# Patient Record
Sex: FEMALE | Race: WHITE | Employment: OTHER | URBAN - METROPOLITAN AREA
[De-identification: names, ages, dates, MRNs, and addresses within clinical notes are randomized per-mention and may not be internally consistent; named-entity substitution may affect disease eponyms.]

---

## 2019-02-13 ENCOUNTER — HOSPITAL ENCOUNTER (OUTPATIENT)
Dept: WOMENS IMAGING | Age: 64
Discharge: HOME | End: 2019-02-13
Payer: COMMERCIAL

## 2019-02-13 DIAGNOSIS — Z12.31 VISIT FOR SCREENING MAMMOGRAM: ICD-10-CM

## 2019-02-13 PROCEDURE — 77067 SCR MAMMO BI INCL CAD: CPT

## 2019-04-07 LAB
CHOLESTEROL, TOTAL: 130 MG/DL
CHOLESTEROL/HDL RATIO: NORMAL
HDLC SERPL-MCNC: 54 MG/DL (ref 35–70)
LDL CHOLESTEROL CALCULATED: 64 MG/DL (ref 0–160)
TRIGL SERPL-MCNC: 58 MG/DL
VLDLC SERPL CALC-MCNC: 12 MG/DL

## 2019-04-30 ENCOUNTER — TELEPHONE (OUTPATIENT)
Dept: FAMILY MEDICINE CLINIC | Age: 64
End: 2019-04-30

## 2019-04-30 NOTE — TELEPHONE ENCOUNTER
PT ADVISED OK PER DR HARDIN TO P/U A COPY OF BW 4/8/19 AND PAP RESULTS 4/1/19. COPIES MADE AND PUT UPFRONT.     Electronically signed by Yasmin Magaña MA on 4/30/2019 at 4:23 PM

## 2019-09-06 DIAGNOSIS — Z86.69 H/O BELL'S PALSY: ICD-10-CM

## 2019-09-06 RX ORDER — LANOLIN ALCOHOL/MO/W.PET/CERES
3 CREAM (GRAM) TOPICAL NIGHTLY PRN
COMMUNITY

## 2019-09-06 RX ORDER — VIT C/B6/B5/MAGNESIUM/HERB 173 50-5-6-5MG
2 CAPSULE ORAL DAILY
COMMUNITY

## 2019-09-06 RX ORDER — CALCIUM LACTATE 84 MG(648)
TABLET ORAL
COMMUNITY
End: 2019-12-31

## 2019-09-06 RX ORDER — CALCIUM/MAGNESIUM/ZINC 333-133 MG
1 TABLET ORAL DAILY
COMMUNITY

## 2019-12-31 ENCOUNTER — OFFICE VISIT (OUTPATIENT)
Dept: FAMILY MEDICINE CLINIC | Age: 64
End: 2019-12-31
Payer: COMMERCIAL

## 2019-12-31 VITALS
SYSTOLIC BLOOD PRESSURE: 130 MMHG | DIASTOLIC BLOOD PRESSURE: 84 MMHG | HEART RATE: 68 BPM | OXYGEN SATURATION: 97 % | HEIGHT: 66 IN | WEIGHT: 173.8 LBS | BODY MASS INDEX: 27.93 KG/M2

## 2019-12-31 DIAGNOSIS — Z00.00 WELLNESS EXAMINATION: ICD-10-CM

## 2019-12-31 PROCEDURE — 99396 PREV VISIT EST AGE 40-64: CPT | Performed by: FAMILY MEDICINE

## 2019-12-31 ASSESSMENT — PATIENT HEALTH QUESTIONNAIRE - PHQ9
2. FEELING DOWN, DEPRESSED OR HOPELESS: 0
SUM OF ALL RESPONSES TO PHQ QUESTIONS 1-9: 0
SUM OF ALL RESPONSES TO PHQ9 QUESTIONS 1 & 2: 0
1. LITTLE INTEREST OR PLEASURE IN DOING THINGS: 0
SUM OF ALL RESPONSES TO PHQ QUESTIONS 1-9: 0

## 2020-01-28 ENCOUNTER — TELEPHONE (OUTPATIENT)
Dept: FAMILY MEDICINE CLINIC | Age: 65
End: 2020-01-28

## 2020-01-30 PROBLEM — Z00.00 WELLNESS EXAMINATION: Status: RESOLVED | Noted: 2019-12-31 | Resolved: 2020-01-30

## 2020-02-04 ENCOUNTER — TELEPHONE (OUTPATIENT)
Dept: FAMILY MEDICINE CLINIC | Age: 65
End: 2020-02-04

## 2020-02-04 NOTE — TELEPHONE ENCOUNTER
TO DR. Sammy Dao-    PATIENT WANTS TO KNOW WHEN SHE IS SUPPOSE TO GET ANOTHER MAMMOGRAM---HER LAST ONE WAS 2/13/2019. ALSO, SHE ASKED A WHILE AGO IF YOU WOULD ACCEPT HER  AS A PATIENT AND SHE HAS HAD NO ANSWER YET. (I DID LET PATIENT KNOW THAT DR. Arroyo 96 UNTIL 2/24/20)      [facility] -[id number]

## 2020-03-26 ENCOUNTER — TELEPHONE (OUTPATIENT)
Dept: FAMILY MEDICINE CLINIC | Age: 65
End: 2020-03-26

## 2020-12-15 ENCOUNTER — OFFICE VISIT (OUTPATIENT)
Dept: PRIMARY CARE CLINIC | Age: 65
End: 2020-12-15

## 2020-12-15 ENCOUNTER — HOSPITAL ENCOUNTER (OUTPATIENT)
Age: 65
Setting detail: SPECIMEN
Discharge: HOME | End: 2020-12-15
Payer: OTHER GOVERNMENT

## 2020-12-15 VITALS — OXYGEN SATURATION: 98 % | HEART RATE: 90 BPM | TEMPERATURE: 96.2 F

## 2020-12-15 PROCEDURE — 99213 OFFICE O/P EST LOW 20 MIN: CPT | Performed by: NURSE PRACTITIONER

## 2020-12-15 PROCEDURE — U0002 COVID-19 LAB TEST NON-CDC: HCPCS

## 2020-12-15 NOTE — PATIENT INSTRUCTIONS
Your COVID 19 test can take 3-5 days for the results come back. We ask that you make a Mychart page and view your test results this way. You will need to Self quarantine until you know your results. Increase fluids rest  Saline nasal spray as directed  Warm salt gargles for throat discomfort  Monitor temperature twice a day  Tylenol for fevers and/or discomfort. If symptoms are worse -Go to the ER. Follow up with your primary doctor    To Whom it May Concern:    Julio Cesar Cordova has been tested for COVID on 12/15/20. They may NOT return to work until their lab test results back and they been fever free for 3 days. If test is positive they must stay home for 2 weeks or until they test negative or as directed by the [facility] Department.

## 2020-12-15 NOTE — PROGRESS NOTES
12/15/2020    HPI:  Chief complaint and history of present illness as per medical assistant/nurse documented today in the Flu/COVID-19 clinic. MEDICATIONS:  Prior to Visit Medications    Medication Sig Taking? Authorizing Provider   UNABLE TO FIND Take 1 tablet by mouth 2 times daily Indications: [address]  Historical Provider, MD   COD LIVER OIL PO Take by mouth OTC  Historical Provider, MD   Echinacea 400 MG CAPS Take 1 capsule by mouth daily  Historical Provider, MD   Garlic 4835 MG CAPS Take 1 tablet by mouth daily  Historical Provider, MD   melatonin 3 MG TABS tablet Take 3 mg by mouth nightly as needed  Historical Provider, MD   Turmeric 500 MG CAPS Take 2 tablets by mouth daily  Historical Provider, MD   diphenhydrAMINE (BENADRYL) 25 MG capsule Take 25 mg by mouth daily as needed for Itching. Historical Provider, MD   ibuprofen (ADVIL;MOTRIN) 200 MG tablet Take 200 mg by mouth daily as needed for Pain.   Historical Provider, MD       Allergies   Allergen Reactions    Azithromycin Hives    Levaquin [Levofloxacin In D5w]     Quinolones Hives    Penicillins Hives   ,   Past Medical History:   Diagnosis Date    Chest pain, unspecified     H/O Bell's palsy     H/O exercise stress test 10-3-14    Negative    Menopause     Rosacea     Sleep apnea     uses C-Pap   ,   Past Surgical History:   Procedure Laterality Date    CYST REMOVAL      TUBAL LIGATION     ,   Social History     Tobacco Use    Smoking status: Former Smoker     Packs/day: 0.25     Years: 3.00     Pack years: 0.75     Types: Cigarettes     Start date: 1971     Quit date: 1974     Years since quittin.9    Smokeless tobacco: Never Used   Substance Use Topics    Alcohol use: Yes     Comment: one or less a day    Drug use: Not on file   ,   Family History   Problem Relation Age of Onset    Coronary Art Dis Mother     Emphysema Mother     Hypertension Father         Brain Aneurysm    Hypertension Sister         sisters x 2    Stroke Maternal Grandmother     Colon Cancer Maternal Grandfather     Stroke Paternal Grandfather    ,   There is no immunization history on file for this patient.,   Health Maintenance   Topic Date Due    Hepatitis C screen  1955    HIV screen  02/07/1972    Diabetes screen  02/07/1997    Colon cancer screen colonoscopy  02/07/2007    Flu vaccine (1) 09/01/2020    DTaP/Tdap/Td vaccine (1 - Tdap) 12/31/2020 (Originally 2/7/1976)    Shingles Vaccine (1 of 2) 12/31/2020 (Originally 2/7/2007)    Breast cancer screen  02/13/2021    Cervical cancer screen  04/01/2022    Lipid screen  04/06/2024    Hepatitis A vaccine  Aged Out    Hepatitis B vaccine  Aged Out    Hib vaccine  Aged Out    Meningococcal (ACWY) vaccine  Aged Out    Pneumococcal 0-64 years Vaccine  Aged Out       PHYSICAL EXAM:  Physical Exam  Constitutional:       Appearance: Normal appearance. HENT:      Head: Normocephalic. Right Ear: Tympanic membrane, ear canal and external ear normal.      Left Ear: Tympanic membrane, ear canal and external ear normal.      Nose: Congestion present. Mouth/Throat:      Lips: Pink. Mouth: Mucous membranes are moist.      Pharynx: Oropharynx is clear. Neck:      Musculoskeletal: Neck supple. Cardiovascular:      Rate and Rhythm: Normal rate and regular rhythm. Heart sounds: Normal heart sounds. Pulmonary:      Effort: Pulmonary effort is normal.      Breath sounds: Normal breath sounds. Skin:     General: Skin is warm and dry. Neurological:      Mental Status: She is alert and oriented to person, place, and time. Psychiatric:         Mood and Affect: Mood normal.         Behavior: Behavior normal.         ASSESSMENT/PLAN:  1. Nasal congestion  Your COVID 19 test can take 3-5 days for the results come back. We ask that you make a Mychart page and view your test results this way. You will need to Self quarantine until you know your results.      Increase fluids rest  Saline nasal spray as directed  Warm salt gargles for throat discomfort  Monitor temperature twice a day  Tylenol for fevers and/or discomfort. If symptoms are worse -Go to the ER. Follow up with your primary doctor    To Whom it May Concern:    Sheela Pappas has been tested for COVID on 12/15/20. They may NOT return to work until their lab test results back and they been fever free for 3 days. If test is positive they must stay home for 2 weeks or until they test negative or as directed by the [facility]. - COVID-19 Ambulatory    2. Loss of taste    - COVID-19 Ambulatory    3. Loss of smell    - COVID-19 Ambulatory      FOLLOW-UP:  Return if symptoms worsen or fail to improve.     In addition to other information, the printed after visit summary provided to the patient includes:  [x] COVID-19 Self care instructions  [x] COVID-19 General patient information

## 2020-12-15 NOTE — PROGRESS NOTES
12/15/20  Muna Gordillo  1955    FLU/COVID-19 CLINIC EVALUATION    HPI SYMPTOMS:    Employer: Retired  [x] Fevers  [] Chills  [] Cough  [] Coughing up blood  [] Chest Congestion  [x] Nasal Congestion  [] Feeling short of breath  [] Sometimes  [] Frequently  [] All the time  [] Chest pain  [] Headaches  []Tolerable  [] Severe  [] Sore throat  [] Muscle aches  [] Nausea  [] Vomiting  []Unable to keep fluids down  [] Diarrhea  []Severe    [x] OTHER SYMPTOMS:  Loss of taste and smell    Symptom Duration:   [] 1  [] 2   [] 3   [] 4    [x] 5   [] 6   [] 7   [] 8   [] 9   [] 10   [] 11   [] 12   [] 13   [] 14   [] Longer than 14 days    Symptom course:   [] Worsening     [x] Stable     [] Improving    RISK FACTORS:    [] Pregnant or possibly pregnant  [x] Age over 61  [] Diabetes  [] Heart disease  [] Asthma  [] COPD/Other chronic lung diseases  [] Active Cancer  [] On Chemotherapy  [] Taking oral steroids  [] History Lymphoma/Leukemia  [] Close contact with a lab confirmed COVID-19 patient within 14 days of symptom onset  [] History of travel from affected geographical areas within 14 days of symptom onset       VITALS:  There were no vitals filed for this visit. TESTS:    POCT FLU:  [] Positive     []Negative    ASSESSMENT:    [] Flu  [] Possible COVID-19  [] Strep    PLAN:    [] Discharge home with written instructions for:  [] Flu management  [] Possible COVID-19 infection with self-quarantine and management of symptoms  [] Follow-up with primary care physician or emergency department if worsens  [] Evaluation per physician/NP/PA in clinic  [] Sent to ER       An  electronic signature was used to authenticate this note.      --Av Reddy LPN on 83/24/7216 at 8:32 AM

## 2020-12-16 LAB
SARS-COV-2: DETECTED
SOURCE: ABNORMAL

## 2021-12-16 LAB
ALBUMIN SERPL-MCNC: 3.9 G/DL
ALBUMIN SERPL-MCNC: 3.9 G/DL
ALP BLD-CCNC: 55 U/L
ALP BLD-CCNC: 55 U/L
ALT SERPL-CCNC: 66 U/L
ALT SERPL-CCNC: 66 U/L
ANION GAP SERPL CALCULATED.3IONS-SCNC: 10 MMOL/L
ANION GAP SERPL CALCULATED.3IONS-SCNC: 10 MMOL/L
AST SERPL-CCNC: 43 U/L
AST SERPL-CCNC: 43 U/L
BASOPHILS ABSOLUTE: NORMAL
BASOPHILS RELATIVE PERCENT: NORMAL
BILIRUB SERPL-MCNC: 0.5 MG/DL (ref 0.1–1.4)
BILIRUB SERPL-MCNC: 4.2 MG/DL (ref 0.1–1.4)
BUN BLDV-MCNC: 10 MG/DL
BUN BLDV-MCNC: 10 MG/DL
CALCIUM SERPL-MCNC: 8.9 MG/DL
CALCIUM SERPL-MCNC: 8.9 MG/DL
CHLORIDE BLD-SCNC: 105 MMOL/L
CHLORIDE BLD-SCNC: 105 MMOL/L
CHOLESTEROL, TOTAL: 106 MG/DL
CHOLESTEROL/HDL RATIO: NORMAL
CO2: 105 MMOL/L
CO2: 105 MMOL/L
CREAT SERPL-MCNC: 0.9 MG/DL
CREAT SERPL-MCNC: 0.9 MG/DL
EOSINOPHILS ABSOLUTE: NORMAL
EOSINOPHILS RELATIVE PERCENT: NORMAL
GFR CALCULATED: 67
GFR CALCULATED: 67
GLUCOSE BLD-MCNC: 102 MG/DL
GLUCOSE BLD-MCNC: 102 MG/DL
HCT VFR BLD CALC: NORMAL %
HDLC SERPL-MCNC: 37 MG/DL (ref 35–70)
HEMOGLOBIN: NORMAL
LDL CHOLESTEROL CALCULATED: 50 MG/DL (ref 0–160)
LYMPHOCYTES ABSOLUTE: NORMAL
LYMPHOCYTES RELATIVE PERCENT: NORMAL
MCH RBC QN AUTO: NORMAL PG
MCHC RBC AUTO-ENTMCNC: NORMAL G/DL
MCV RBC AUTO: NORMAL FL
MONOCYTES ABSOLUTE: NORMAL
MONOCYTES RELATIVE PERCENT: NORMAL
NEUTROPHILS ABSOLUTE: NORMAL
NEUTROPHILS RELATIVE PERCENT: NORMAL
NONHDLC SERPL-MCNC: NORMAL MG/DL
PDW BLD-RTO: NORMAL %
PLATELET # BLD: NORMAL 10*3/UL
PMV BLD AUTO: NORMAL FL
POTASSIUM SERPL-SCNC: 4.5 MMOL/L
POTASSIUM SERPL-SCNC: 4.5 MMOL/L
RBC # BLD: NORMAL 10*6/UL
SODIUM BLD-SCNC: 137 MMOL/L
SODIUM BLD-SCNC: 137 MMOL/L
TOTAL PROTEIN: 8.1
TOTAL PROTEIN: 8.1
TRIGL SERPL-MCNC: 95 MG/DL
TSH SERPL DL<=0.05 MIU/L-ACNC: 1.45 UIU/ML
VLDLC SERPL CALC-MCNC: 19 MG/DL
WBC # BLD: NORMAL 10*3/UL

## 2021-12-20 ENCOUNTER — TELEPHONE (OUTPATIENT)
Dept: INTERNAL MEDICINE CLINIC | Age: 66
End: 2021-12-20

## 2021-12-20 NOTE — TELEPHONE ENCOUNTER
----- Message from Rasta Viji sent at 2021  2:46 PM EST -----  Subject: Appointment Request    Reason for Call: New Patient Request Appointment    QUESTIONS  Type of Appointment? New Patient/New to Provider  Reason for appointment request? No appointments available during search  Additional Information for Provider? Friends of Faizan [employer] (aidan and Abby)   referred her to [employer] and patient is looking to schedule a new   patient exam with her. No appointments were available looking to schedule   in Feb. if she is not excepting at this time they would like to be put on   a waiting list. also looking to get her  in with Tacy Cure also   Avis Mckinney)  ---------------------------------------------------------------------------  --------------  [employer]  What is the best way for the office to contact you? OK to leave message on   voicemail  Preferred Call Back Phone Number? [phone]  ---------------------------------------------------------------------------  --------------  SCRIPT ANSWERS  Relationship to Patient? Self  Specialty Confirmation? Primary Care  Is this the first appointment to establish care for a ? No  Have you been diagnosed with, awaiting test results for, or told that you   are suspected of having COVID-19 (Coronavirus)? (If patient has tested   negative or was tested as a requirement for work, school, or travel and   not based on symptoms, answer no)? No  Within the past two weeks have you developed any of the following symptoms   (answer no if symptoms have been present longer than 2 weeks or began   more than 2 weeks ago)? Fever or Chills, Cough, Shortness of breath or   difficulty breathing, Loss of taste or smell, Sore throat, Nasal   congestion, Sneezing or runny nose, Fatigue or generalized body aches   (answer no if pain is specific to a body part e.g. back pain), Diarrhea,   Headache?  No  Have you had close contact with someone with COVID-19 in the last 14 days? No  (Service Expert  click yes below to proceed with [employer] As Usual   Scheduling)?  Yes

## 2022-02-07 ENCOUNTER — OFFICE VISIT (OUTPATIENT)
Dept: INTERNAL MEDICINE CLINIC | Age: 67
End: 2022-02-07
Payer: MEDICARE

## 2022-02-07 VITALS
RESPIRATION RATE: 18 BRPM | SYSTOLIC BLOOD PRESSURE: 132 MMHG | HEIGHT: 66 IN | OXYGEN SATURATION: 97 % | BODY MASS INDEX: 29.09 KG/M2 | DIASTOLIC BLOOD PRESSURE: 84 MMHG | WEIGHT: 181 LBS | HEART RATE: 76 BPM

## 2022-02-07 DIAGNOSIS — Z12.4 CERVICAL CANCER SCREENING: ICD-10-CM

## 2022-02-07 DIAGNOSIS — R74.01 TRANSAMINITIS: ICD-10-CM

## 2022-02-07 DIAGNOSIS — Z12.31 ENCOUNTER FOR SCREENING MAMMOGRAM FOR BREAST CANCER: ICD-10-CM

## 2022-02-07 DIAGNOSIS — L98.9 SKIN LESION: ICD-10-CM

## 2022-02-07 DIAGNOSIS — R00.2 PALPITATIONS: Primary | ICD-10-CM

## 2022-02-07 DIAGNOSIS — G47.33 OSA ON CPAP: ICD-10-CM

## 2022-02-07 DIAGNOSIS — R73.01 IMPAIRED FASTING GLUCOSE: ICD-10-CM

## 2022-02-07 DIAGNOSIS — Z99.89 OSA ON CPAP: ICD-10-CM

## 2022-02-07 DIAGNOSIS — Z12.11 COLON CANCER SCREENING: ICD-10-CM

## 2022-02-07 PROCEDURE — 99204 OFFICE O/P NEW MOD 45 MIN: CPT | Performed by: INTERNAL MEDICINE

## 2022-02-07 RX ORDER — MAGNESIUM OXIDE 400 MG/1
400 TABLET ORAL DAILY
COMMUNITY

## 2022-02-07 RX ORDER — VITAMIN B COMPLEX
1 CAPSULE ORAL DAILY
COMMUNITY

## 2022-02-07 RX ORDER — MULTIVIT WITH MINERALS/LUTEIN
1000 TABLET ORAL DAILY
COMMUNITY

## 2022-02-07 RX ORDER — ASPIRIN 81 MG/1
81 TABLET, CHEWABLE ORAL DAILY
COMMUNITY

## 2022-02-07 SDOH — ECONOMIC STABILITY: FOOD INSECURITY: WITHIN THE PAST 12 MONTHS, YOU WORRIED THAT YOUR FOOD WOULD RUN OUT BEFORE YOU GOT MONEY TO BUY MORE.: NEVER TRUE

## 2022-02-07 SDOH — ECONOMIC STABILITY: FOOD INSECURITY: WITHIN THE PAST 12 MONTHS, THE FOOD YOU BOUGHT JUST DIDN'T LAST AND YOU DIDN'T HAVE MONEY TO GET MORE.: NEVER TRUE

## 2022-02-07 ASSESSMENT — PATIENT HEALTH QUESTIONNAIRE - PHQ9
SUM OF ALL RESPONSES TO PHQ QUESTIONS 1-9: 1
SUM OF ALL RESPONSES TO PHQ9 QUESTIONS 1 & 2: 1
SUM OF ALL RESPONSES TO PHQ QUESTIONS 1-9: 1
SUM OF ALL RESPONSES TO PHQ QUESTIONS 1-9: 1
1. LITTLE INTEREST OR PLEASURE IN DOING THINGS: 0
2. FEELING DOWN, DEPRESSED OR HOPELESS: 1
SUM OF ALL RESPONSES TO PHQ QUESTIONS 1-9: 1

## 2022-02-07 ASSESSMENT — SOCIAL DETERMINANTS OF HEALTH (SDOH): HOW HARD IS IT FOR YOU TO PAY FOR THE VERY BASICS LIKE FOOD, HOUSING, MEDICAL CARE, AND HEATING?: NOT HARD AT ALL

## 2022-02-07 NOTE — PROGRESS NOTES
2/8/22    Saira Mohan  1955    Chief Complaint   Patient presents with   Riky Zavala [facility] New Doctor       History of Present Illness:  Saira Mohan is a 74 y.o. pleasant lady presenting today to establish care as a new patient with a chief complaint of skin lesions, transaminitis. She has a past medical history significant for:  HL (LDL 50, TG 95 on 12/16/2021), not on statin   ANA, on CPAP  Bell's palsy L sided (2018)   COVID-19 (12/2020, ?12/2021)  S/p tubal ligation   Former smoker (quit 1974)   On multiple supplements through [employer] Dec 2021 with ALT 66, AST 43. . # Dr. Nix Sat GI: colonoscopy 2014. Was recommended 10-year surveillance. Reports she had 1 polyp in the past and repeat after 5 years was in 2014 and it was clear. # Reports pap smear in 2019. Unsure if she had HPV done. She was seeing Dr. Casper Dhaliwal. 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   # Last mammogram was done 2019 and it was unremarkable. # Father with brain aneurysm/?AVM. Patient was having headaches in Dec 2021 but thinks she might have had COVID-19. Now her headaches have subsided. BP stable. # Patient has palpitations which cause her to feel light-headed. This occurs once every 3-4 months. This lasts for a few seconds. No known trigger. # Has skin lesion on her back that has changed over the years. Also has multiple skin tags. Also has a lesion on her nose L bridge. She has had multiple lesions on her face in the past and she has tried holistic measures in the past to improve those (both topical and PO). Also with skin lesion on her L nipple for years. She has manipulated it and sometimes discharge is expressed (similar to a kraus or blackhead per patient).      Retired from [employer] maintenance:   Health Maintenance Due   Topic Date Due    Hepatitis C screen  Never done    COVID-19 Vaccine (1) Never done    HIV screen  Never done    DTaP/Tdap/Td vaccine (1 - Tdap) Never done    Diabetes screen  Never done    Colon cancer screen colonoscopy  Never done    Shingles Vaccine (1 of 2) Never done    DEXA (modify frequency per FRAX score)  Never done    Breast cancer screen  02/13/2021    Flu vaccine (1) Never done   [employer] Visit (AWV)  Never done    Pneumococcal 65+ years Vaccine (1 of 1 - PPSV23) Never done         Review of Systems:  Constitutional: no fevers, no chills, no night sweats, no weight loss, no weight gain, no fatigue   Pain assessment: no pain  Head: no headaches  Ears: no hearing loss, no tinnitus, no vertigo  Eyes: no blurry vision, no diplopia, no dryness, no itchiness  Mouth: no oral ulcers, no dry mouth, no sore throat  Nose: no nasal congestion, no epistaxis  Cardiac: no chest pain, infrequent palpitations, no leg swelling, no orthopnea, no PND, no syncope  Pulmonary: no dyspnea, no cough, no wheezing, no hemoptysis  GI: no nausea, no vomiting, no diarrhea, no constipation, no abdominal pain, no hematochezia  : no dysuria, no frequency, no urgency, no hematuria, no frothy urine, no dyspareunia, no pelvic pain, no vaginal bleeding, no abnormal vaginal discharge  MSK: no arthralgias, no myalgias, no early morning stiffness, no Raynaud's   Neuro: no focal neurological deficits, no seizures  Sleep: no snoring, no daytime somnolence   Psych: no depression, no anxiety, no suicidal ideation      Physical Exam:  VITALS:   /84 (Site: Left Upper Arm, Position: Sitting, Cuff Size: Large Adult)   Pulse 76   Resp 18   Ht 5' 6\" (1.676 m)   Wt 181 lb (82.1 kg)   SpO2 97%   BMI 29.21 kg/m²     PHYSICAL EXAMINATION:  General: alert, awake, and oriented to time, place, person, and situation. Not in acute distress   Skin: multiple skin tags on neck, multiple hyperpigmented macules on back, erythematous patch on L nasal bridge   Head: normocephalic/atraumatic  Eyes: anicteric sclera, well-injected conjunctiva.  Pupils are equally round and reactive to light. Extraocular movements are intact   Nose: no septal deviation evident  Sinuses: no sinus tenderness  Ears: external ears normal  Neck: supple, no cervical lymphadenopathy, thyroid symmetric and not enlarged, no bruits   Heart: regular rate and rhythm, regular S1/S2, no S3/S4, no audible murmurs, no audible friction rub  Lungs: clear to auscultation bilaterally, no audible crackles, no audible wheezes, no audible rhonchi    Abdomen: normal bowel sounds, soft abdomen, non-tender, no palpable masses  Extremities: no edema, warm, no cyanosis, no clubbing. Good capillary refill   MSK: no tenderness across spinous processes, full ROM in all 4 extremities. No joint swelling or tenderness   Peripheral vascular: 2+ pulses symmetric (radial)  Neuro: gait normal, CN II-XII intact, motor power 5/5 in all 4 extremities, sensation intact and symmetric    Labs   I have personally reviewed labs, and discussed pertinent findings with patient on this date 2/8/2022     Imaging   I have personally reviewed imaging, and discussed pertinent findings with patient on this date 2/8/2022     Other notes  I have personally reviewed other notes, and discussed pertinent findings with patient on this date 2/8/2022       Assessment/Plan:     1. Palpitations  Needs updated labs. Cut back caffeine  May consider Cardiology referral? They occur every 3-4 months so Holter monitor may not be feasible. Start diary. 2. Skin lesion  - External Referral To Dermatology    3. Transaminitis  - HEPATIC FUNCTION PANEL; Future    4. Impaired fasting glucose  - HEMOGLOBIN A1C; Future    5. ANA on CPAP  Continue CPAP    6. Cervical cancer screening  Pap smear with me in the next 2 weeks    7. Encounter for screening mammogram for breast cancer  - [facility] ANGÉLICA DIGITAL SCREEN BILATERAL    8.  Colon cancer screening  Will try to get records from her 2014 colonoscopy (was told 10 year surveillance)      Care discussed with patient and questions answered. Patient verbalizes understanding and agrees with plan. Discussed with patient the importance of continuity of care. I encouraged patient to schedule next appointment within 2 weeks (pap smear) with me. Patient prefers to be reached by Phone call at [phone] for future medical correspondence. Encouraged to activate MyChart. I reviewed and reconciled the medications this visit. I reviewed and updated the past medical, surgical, social, and family history during this visit. After visit summary provided.        Supa Franks MD  Internal Medicine  2/8/2022   8:42 AM

## 2022-02-17 ENCOUNTER — OFFICE VISIT (OUTPATIENT)
Dept: INTERNAL MEDICINE CLINIC | Age: 67
End: 2022-02-17
Payer: MEDICARE

## 2022-02-17 VITALS
WEIGHT: 183 LBS | HEART RATE: 86 BPM | RESPIRATION RATE: 20 BRPM | SYSTOLIC BLOOD PRESSURE: 118 MMHG | DIASTOLIC BLOOD PRESSURE: 82 MMHG | BODY MASS INDEX: 29.41 KG/M2 | HEIGHT: 66 IN | OXYGEN SATURATION: 97 %

## 2022-02-17 DIAGNOSIS — N89.8 VAGINAL DRYNESS: ICD-10-CM

## 2022-02-17 DIAGNOSIS — Z12.4 CERVICAL CANCER SCREENING: Primary | ICD-10-CM

## 2022-02-17 DIAGNOSIS — Z12.31 ENCOUNTER FOR SCREENING MAMMOGRAM FOR BREAST CANCER: ICD-10-CM

## 2022-02-17 PROCEDURE — 99397 PER PM REEVAL EST PAT 65+ YR: CPT | Performed by: INTERNAL MEDICINE

## 2022-02-17 NOTE — PROGRESS NOTES
2/17/22    Louis Kidd  1955    Chief Complaint   Patient presents with    Gynecologic Exam       History of Present Illness:  Louis Kidd is a 74 y.o. pleasant lady presenting today for pap smear and pelvic examination. She has a past medical history significant for:  HL (LDL 50, TG 95 on 12/16/2021), not on statin   ANA, on CPAP  Bell's palsy L sided (2018)   COVID-19 (12/2020, ?12/2021)  S/p tubal ligation   Former smoker (quit 1974)   On multiple supplements through [employer]     # Reports pap smear in 2019. Unsure if she had HPV done. She was seeing Dr. Yamilex Jeong. 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   # Last mammogram was done 2019 and it was unremarkable. # Having vaginal dryness. # Has some stress urinary incontinence, at times has urge incontinence. She is interested in pelvic floor exercises which she will download.        Health maintenance:   Health Maintenance Due   Topic Date Due    Hepatitis C screen  Never done    COVID-19 Vaccine (1) Never done    HIV screen  Never done    DTaP/Tdap/Td vaccine (1 - Tdap) Never done    Diabetes screen  Never done    Colorectal Cancer Screen  Never done    Shingles Vaccine (1 of 2) Never done    DEXA (modify frequency per FRAX score)  Never done    Breast cancer screen  02/13/2021    Flu vaccine (1) Never done   [employer] Visit (AWV)  Never done    Pneumococcal 65+ years Vaccine (1 of 1 - PPSV23) Never done         Review of Systems:  Constitutional: no fevers, no chills, no night sweats, no weight loss, no weight gain, no fatigue   Pain assessment: no pain  Head: no headaches  Ears: no hearing loss, no tinnitus, no vertigo  Eyes: no blurry vision, no diplopia, no dryness, no itchiness  Mouth: no oral ulcers, no dry mouth, no sore throat  Nose: no nasal congestion, no epistaxis  Cardiac: no chest pain, infrequent palpitations, no leg swelling, no orthopnea, no PND, no syncope  Pulmonary: no dyspnea, no cough, no wheezing, no hemoptysis  GI: no nausea, no vomiting, no diarrhea, no constipation, no abdominal pain, no hematochezia  : No dysuria, no frequency, no urgency, no hematuria, no frothy urine, no dyspareunia, no pelvic pain, no vaginal bleeding, no abnormal vaginal discharge  MSK: no arthralgias, no myalgias, no early morning stiffness, no Raynaud's   Neuro: no focal neurological deficits, no seizures  Sleep: no snoring, no daytime somnolence   Psych: no depression, no anxiety, no suicidal ideation      Physical Exam:  VITALS:   /82 (Site: Right Upper Arm, Position: Sitting, Cuff Size: Large Adult)   Pulse 86   Resp 20   Ht 5' 6\" (1.676 m)   Wt 183 lb (83 kg)   SpO2 97%   BMI 29.54 kg/m²     PHYSICAL EXAMINATION:  General: alert, awake, and oriented to time, place, person, and situation. Not in acute distress   Skin: multiple skin tags on neck, multiple hyperpigmented macules on back, erythematous patch on L nasal bridge   Head: normocephalic/atraumatic  Eyes: anicteric sclera, well-injected conjunctiva. Pupils are equally round and reactive to light. Extraocular movements are intact   Neck: supple, no cervical lymphadenopathy, thyroid symmetric and not enlarged, no bruits   Heart: regular rate and rhythm, regular S1/S2, no S3/S4, no audible murmurs, no audible friction rub  Lungs: clear to auscultation bilaterally, no audible crackles, no audible wheezes, no audible rhonchi    Abdomen: normal bowel sounds, soft abdomen, non-tender, no palpable masses  Extremities: no edema, warm, no cyanosis, no clubbing. Good capillary refill   MSK: no tenderness across spinous processes, full ROM in all 4 extremities. No joint swelling or tenderness   Peripheral vascular: 2+ pulses symmetric (radial)  Pelvic exam (with female chaperone present in the room during examination): normal external genitalia, vulva, vagina, cervix, uterus, and adnexa. No rash or lesions noted on the right labia.  No rash or lesions noted on the left labia. No cervical motion tenderness, discharge, or friability. Dryness noted. No mass, tenderness, or fullness appreciated in right adnexum. No masses, tenderness, or fullness appreciated in left adnexum. No right inguinal adenopathy present. No left inguinal adenopathy present. Specimens obtained for pap smear. Patient tolerated exam well. Labs   I have personally reviewed labs, and discussed pertinent findings with patient     Imaging   I have personally reviewed imaging, and discussed pertinent findings with patient    Other notes  I have personally reviewed other notes, and discussed pertinent findings with patient      Assessment/Plan:     1. Cervical cancer screening  - PAP SMEAR    2. Vaginal dryness  Recommend KY gel during intercourse. May consider topical estrogen. Recommend against PO estrogen due to side effect profile (discussed with patient). She is holistic and wants to try coconut oil first (as recommended by her Chiropractor)     3. Encounter for screening mammogram for breast cancer  - [facility] ANGÉLICA DIGITAL SCREEN BILATERAL      Care discussed with patient and questions answered. Patient verbalizes understanding and agrees with plan. Discussed with patient the importance of continuity of care. I encouraged patient to schedule next appointment within 4 months with me. Patient prefers to be reached by Phone call at [phone] for future medical correspondence. Encouraged to activate [facility]. I reviewed and reconciled the medications this visit. I reviewed and updated the past medical, surgical, social, and family history during this visit. After visit summary provided.        Tommy Garcia MD  Internal Medicine  2/17/2022   12:08 PM

## 2022-02-17 NOTE — PROGRESS NOTES
Bladder leakage   Chiropractor does a pelvic lift. Collette Deist about a surgery and would like more information. Vaginal dryness. Painful sex. (maybe gyn appt.)   Vaginal odor for a long time.

## 2022-02-18 LAB
HPV COMMENT: NORMAL
HPV TYPE 16: NOT DETECTED
HPV TYPE 18: NOT DETECTED
HPVOH (OTHER TYPES): NOT DETECTED

## 2022-04-12 ENCOUNTER — HOSPITAL ENCOUNTER (OUTPATIENT)
Dept: WOMENS IMAGING | Age: 67
Discharge: HOME | End: 2022-04-12
Payer: MEDICARE

## 2022-04-12 DIAGNOSIS — Z12.31 ENCOUNTER FOR SCREENING MAMMOGRAM FOR MALIGNANT NEOPLASM OF BREAST: ICD-10-CM

## 2022-04-12 PROCEDURE — 77067 SCR MAMMO BI INCL CAD: CPT

## 2022-04-14 NOTE — RESULT ENCOUNTER NOTE
Nithya:    Betty Mrs. Maribel Berrios; Your PCP is currently out; we are helping to cover her inbox; your mammogram findings are normal and there is no concern for breast cancer at this time. Radiology recommends repeat in 1 year. Please let me know if you have any other questions or concerns.   Thanks,  Romana Watts PA-C

## 2022-05-02 ENCOUNTER — TELEPHONE (OUTPATIENT)
Dept: INTERNAL MEDICINE CLINIC | Age: 67
End: 2022-05-02

## 2022-05-02 DIAGNOSIS — R74.01 TRANSAMINITIS: Primary | ICD-10-CM

## 2022-05-02 DIAGNOSIS — E78.5 DYSLIPIDEMIA: ICD-10-CM

## 2022-05-02 NOTE — TELEPHONE ENCOUNTER
She wants the lipid panel and cmp that she had done in December repeated and would like the orders sent to [facility]. She paid out of pocket for the ones in December.

## 2022-05-02 NOTE — TELEPHONE ENCOUNTER
Pt. Called requesting to have the same lab orders added that she had done at [address] to the labs she had drawn today at [address] [place] [address] in [facility]. Today she had hepatic function and hemoglobin A1c.

## 2022-05-03 LAB
ALBUMIN SERPL-MCNC: 4.7 G/DL (ref 3.8–4.8)
ALP BLD-CCNC: 53 IU/L (ref 44–121)
ALT SERPL-CCNC: 37 IU/L (ref 0–32)
AST SERPL-CCNC: 30 IU/L (ref 0–40)
BILIRUB SERPL-MCNC: 0.6 MG/DL (ref 0–1.2)
BILIRUBIN DIRECT: 0.23 MG/DL (ref 0–0.4)
HBA1C MFR BLD: 5.5 % (ref 4.8–5.6)
TOTAL PROTEIN: 7.1 G/DL (ref 6–8.5)

## 2022-05-04 LAB
ALBUMIN SERPL-MCNC: 4.8 G/DL
ALP BLD-CCNC: 51 U/L
ALT SERPL-CCNC: 34 U/L
ANION GAP SERPL CALCULATED.3IONS-SCNC: 2.1 MMOL/L
AST SERPL-CCNC: 29 U/L
BILIRUB SERPL-MCNC: 0.7 MG/DL (ref 0.1–1.4)
BUN BLDV-MCNC: 11 MG/DL
CALCIUM SERPL-MCNC: 9.7 MG/DL
CHLORIDE BLD-SCNC: 107 MMOL/L
CHOLESTEROL, TOTAL: 95 MG/DL
CHOLESTEROL/HDL RATIO: 0
CO2: 21 MMOL/L
CREAT SERPL-MCNC: 0.81 MG/DL
GFR CALCULATED: 81
GLUCOSE BLD-MCNC: 96 MG/DL
HDLC SERPL-MCNC: 53 MG/DL (ref 35–70)
LDL CHOLESTEROL CALCULATED: 30 MG/DL (ref 0–160)
NONHDLC SERPL-MCNC: 30 MG/DL
POTASSIUM SERPL-SCNC: 4.5 MMOL/L
SODIUM BLD-SCNC: 144 MMOL/L
TOTAL PROTEIN: 7.1
TRIGL SERPL-MCNC: 47 MG/DL
VLDLC SERPL CALC-MCNC: 12 MG/DL

## 2022-05-05 LAB
A/G RATIO: 2.1 (ref 1.2–2.2)
ALBUMIN SERPL-MCNC: 4.8 G/DL (ref 3.8–4.8)
ALP BLD-CCNC: 51 IU/L (ref 44–121)
ALT SERPL-CCNC: 34 IU/L (ref 0–32)
AMBIGUOUS ABBREVIATION: NORMAL
AMBIGUOUS ABBREVIATION: NORMAL
AST SERPL-CCNC: 29 IU/L (ref 0–40)
BILIRUB SERPL-MCNC: 0.7 MG/DL (ref 0–1.2)
BUN / CREAT RATIO: 14 (ref 12–28)
BUN BLDV-MCNC: 11 MG/DL (ref 8–27)
CALCIUM SERPL-MCNC: 9.7 MG/DL (ref 8.7–10.3)
CHLORIDE BLD-SCNC: 107 MMOL/L (ref 96–106)
CHOLESTEROL, TOTAL: 95 MG/DL (ref 100–199)
CO2: 21 MMOL/L (ref 20–29)
COMMENT: ABNORMAL
CREAT SERPL-MCNC: 0.81 MG/DL (ref 0.57–1)
ESTIMATED GLOMERULAR FILTRATION RATE CREATININE EQUATION: 81 ML/MIN/1.73
GLOBULIN: 2.3 G/DL (ref 1.5–4.5)
GLUCOSE BLD-MCNC: 96 MG/DL (ref 65–99)
HDLC SERPL-MCNC: 53 MG/DL
LDL CHOLESTEROL CALCULATED: 30 MG/DL (ref 0–99)
POTASSIUM SERPL-SCNC: 4.5 MMOL/L (ref 3.5–5.2)
SODIUM BLD-SCNC: 144 MMOL/L (ref 134–144)
TOTAL PROTEIN: 7.1 G/DL (ref 6–8.5)
TRIGL SERPL-MCNC: 47 MG/DL (ref 0–149)
VLDLC SERPL CALC-MCNC: 12 MG/DL (ref 5–40)

## 2022-07-21 ENCOUNTER — OFFICE VISIT (OUTPATIENT)
Dept: INTERNAL MEDICINE CLINIC | Age: 67
End: 2022-07-21
Payer: MEDICARE

## 2022-07-21 VITALS
HEART RATE: 64 BPM | WEIGHT: 167 LBS | HEIGHT: 66 IN | SYSTOLIC BLOOD PRESSURE: 140 MMHG | BODY MASS INDEX: 26.84 KG/M2 | DIASTOLIC BLOOD PRESSURE: 86 MMHG | OXYGEN SATURATION: 97 % | RESPIRATION RATE: 20 BRPM

## 2022-07-21 DIAGNOSIS — R03.0 ELEVATED BP WITHOUT DIAGNOSIS OF HYPERTENSION: ICD-10-CM

## 2022-07-21 DIAGNOSIS — R13.12 OROPHARYNGEAL DYSPHAGIA: Primary | ICD-10-CM

## 2022-07-21 DIAGNOSIS — R22.1 NECK SWELLING: ICD-10-CM

## 2022-07-21 PROCEDURE — 99214 OFFICE O/P EST MOD 30 MIN: CPT | Performed by: INTERNAL MEDICINE

## 2022-07-21 PROCEDURE — 1123F ACP DISCUSS/DSCN MKR DOCD: CPT | Performed by: INTERNAL MEDICINE

## 2022-07-21 NOTE — PROGRESS NOTES
7/21/22    Adriana Lowry  1955    Chief Complaint   Patient presents with    Other     4mo fu        History of Present Illness:  Adriana Lowry is a 74 y.o. pleasant lady presenting today with a chief complaint of neck swelling, dysphagia. She has a past medical history significant for:  HL (LDL 30, TG 47 on 5/4/2022), not on statin   OA   ANA, on CPAP  Bell's palsy L sided (2018)   COVID-19 (12/2020, ?12/2021)  S/p tubal ligation   Former smoker (quit 1974)   On multiple supplements through [employer] Dec 2021 with ALT 66, AST 43. . # Dr. Arvind Wise [facility]: colonoscopy 2014. Was recommended 10-year surveillance. Reports she had 1 polyp in the past and repeat after 5 years was in 2014 and it was clear. # Pap/HPV neg 02/2022    She was seeing Dr. Walter David. 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  # Last mammogram was done 2019 and it was unremarkable. # Father with brain aneurysm/?AVM. Patient was having headaches in Dec 2021 but thinks she might have had COVID-19. Now her headaches have subsided. BP today 140/86. Has strong FHx HTN. Eats a lot of salt. # Patient has palpitations which cause her to feel light-headed. This occurs once every 3-4 months. This lasts for a few seconds. No known trigger. # Has skin lesion on her back that has changed over the years. Also has multiple skin tags. Also has a lesion on her nose L bridge. She has had multiple lesions on her face in the past and she has tried holistic measures in the past to improve those (both topical and PO). # Patient reports she has been having choking episodes while eating. Concerned for aspirating her food. Feels she has swollen glands in R side of neck. This does not occur on a daily basis. Reports she had swallow test in the past and it was unremarkable > 10 years ago.      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:   Health Maintenance Due   Topic Date Due    Annual Wellness Visit (AWV)  Never done HIV screen  Never done    Hepatitis C screen  Never done    DTaP/Tdap/Td vaccine (1 - Tdap) Never done    Colorectal Cancer Screen  Never done    Shingles vaccine (1 of 2) Never done    DEXA (modify frequency per FRAX score)  Never done    COVID-19 Vaccine (2 - Booster for Adonay series) 05/04/2021    Pneumococcal 65+ years Vaccine (1 - PCV) Never done         Review of Systems:  Constitutional: no fevers, no chills, no night sweats, no weight loss, no weight gain, no fatigue  Pain assessment: no pain  Head: no headaches  Ears: no hearing loss, no tinnitus, no vertigo  Eyes: no blurry vision, no diplopia, no dryness, no itchiness  Mouth: no oral ulcers, no dry mouth, no sore throat  Nose: no nasal congestion, no epistaxis  Cardiac: no chest pain, infrequent palpitations, no leg swelling, no orthopnea, no PND, no syncope  Pulmonary: no dyspnea, no cough, no wheezing, no hemoptysis  GI: no nausea, no vomiting, no diarrhea, no constipation, no abdominal pain, no hematochezia  : no dysuria, no frequency, no urgency, no hematuria, no frothy urine, no dyspareunia, no pelvic pain, no vaginal bleeding, no abnormal vaginal discharge  MSK: no arthralgias, no myalgias, no early morning stiffness, no Raynaud's  Neuro: no focal neurological deficits, no seizures  Sleep: no snoring, no daytime somnolence  Psych: no depression, no anxiety, no suicidal ideation      Physical Exam:  VITALS:   BP (!) 140/86 (Site: Left Upper Arm)   Pulse 64   Resp 20   Ht 5' 6\" (1.676 m)   Wt 167 lb (75.8 kg)   SpO2 97%   BMI 26.95 kg/m²     PHYSICAL EXAMINATION:  General: alert, awake, and oriented to time, place, person, and situation. Not in acute distress   Skin:  no suspicious rashes, no jaundice  Head: normocephalic/atraumatic  Eyes: anicteric sclera, well-injected conjunctiva. Pupils are equally round and reactive to light.  Extraocular movements are intact   Nose: no septal deviation evident  Sinuses: no sinus tenderness  Ears: external ears normal  Neck: supple, asymmetric swelling on R side   Heart: regular rate and rhythm, regular S1/S2, no S3/S4, no audible murmurs, no audible friction rub  Lungs: clear to auscultation bilaterally, no audible crackles, no audible wheezes, no audible rhonchi    Abdomen: normal bowel sounds, soft abdomen, non-tender, no palpable masses  Extremities: no edema, warm, no cyanosis, no clubbing. Good capillary refill   MSK: no tenderness across spinous processes, full ROM in all 4 extremities. No joint swelling or tenderness   Peripheral vascular: 2+ pulses symmetric (radial)  Neuro: gait normal, CN II-XII intact, motor power 5/5 in all 4 extremities, sensation intact and symmetric    Labs   I have personally reviewed labs, and discussed pertinent findings with patient on this date 7/21/2022     Imaging   I have personally reviewed imaging, and discussed pertinent findings with patient on this date 7/21/2022     Other notes  I have personally reviewed other notes, and discussed pertinent findings with patient on this date 7/21/2022       Assessment/Plan:     1. Oropharyngeal dysphagia  If CT unremarkable, consider barium swallow  - CT SOFT TISSUE NECK W CONTRAST; Future    2. Neck swelling  If CT unremarkable, consider barium swallow  - CT SOFT TISSUE NECK W CONTRAST; Future    3. Elevated BP without diagnosis of hypertension  BP log  Low salt diet discussed  FU in 2 months       Care discussed with patient and questions answered. Patient verbalizes understanding and agrees with plan. Discussed with patient the importance of continuity of care. I encouraged patient to schedule next appointment within 2 months with me. Patient prefers to be reached by Phone call at [phone] for future medical correspondence. Encouraged to activate MyChart. I reviewed and reconciled the medications this visit. I reviewed and updated the past medical, surgical, social, and family history during this visit.    After visit summary provided.        Lorri Bernardo MD  Internal Medicine  7/21/2022   11:10 AM

## 2022-07-25 ENCOUNTER — PATIENT MESSAGE (OUTPATIENT)
Dept: INTERNAL MEDICINE CLINIC | Age: 67
End: 2022-07-25

## 2022-07-25 DIAGNOSIS — R31.9 URINARY TRACT INFECTION WITH HEMATURIA, SITE UNSPECIFIED: Primary | ICD-10-CM

## 2022-07-25 DIAGNOSIS — N39.0 URINARY TRACT INFECTION WITH HEMATURIA, SITE UNSPECIFIED: Primary | ICD-10-CM

## 2022-07-25 LAB
BACTERIA: ABNORMAL /HPF
BILIRUBIN URINE: NEGATIVE
BILIRUBIN, POC: ABNORMAL
BLOOD URINE, POC: ABNORMAL
BLOOD, URINE: ABNORMAL
CLARITY, POC: CLEAR
CLARITY: ABNORMAL
COLOR, POC: ABNORMAL
COLOR: ABNORMAL
EPITHELIAL CELLS, UA: 2 /HPF (ref 0–5)
GLUCOSE URINE, POC: 100
GLUCOSE URINE: NEGATIVE MG/DL
HYALINE CASTS: 1 /LPF (ref 0–8)
KETONES, POC: ABNORMAL
KETONES, URINE: NEGATIVE MG/DL
LEUKOCYTE EST, POC: ABNORMAL
LEUKOCYTE ESTERASE, URINE: ABNORMAL
MICROSCOPIC EXAMINATION: YES
NITRITE, POC: ABNORMAL
NITRITE, URINE: POSITIVE
PH UA: 7 (ref 5–8)
PH, POC: 7
PROTEIN UA: NEGATIVE MG/DL
PROTEIN, POC: ABNORMAL
RBC UA: 2 /HPF (ref 0–4)
SPECIFIC GRAVITY UA: 1 (ref 1–1.03)
SPECIFIC GRAVITY, POC: 1.01
URINE TYPE: ABNORMAL
UROBILINOGEN, POC: 0.2
UROBILINOGEN, URINE: 1 E.U./DL
WBC UA: 88 /HPF (ref 0–5)

## 2022-07-25 PROCEDURE — 81002 URINALYSIS NONAUTO W/O SCOPE: CPT | Performed by: INTERNAL MEDICINE

## 2022-07-25 RX ORDER — SULFAMETHOXAZOLE AND TRIMETHOPRIM 800; 160 MG/1; MG/1
1 TABLET ORAL 2 TIMES DAILY
Qty: 14 TABLET | Refills: 0 | Status: SHIPPED | OUTPATIENT
Start: 2022-07-25 | End: 2022-07-27 | Stop reason: SDUPTHER

## 2022-07-27 ENCOUNTER — TELEPHONE (OUTPATIENT)
Dept: INTERNAL MEDICINE CLINIC | Age: 67
End: 2022-07-27

## 2022-07-27 ENCOUNTER — TELEMEDICINE (OUTPATIENT)
Dept: INTERNAL MEDICINE CLINIC | Age: 67
End: 2022-07-27
Payer: MEDICARE

## 2022-07-27 DIAGNOSIS — N39.0 COMPLICATED UTI (URINARY TRACT INFECTION): Primary | ICD-10-CM

## 2022-07-27 PROCEDURE — 1123F ACP DISCUSS/DSCN MKR DOCD: CPT | Performed by: INTERNAL MEDICINE

## 2022-07-27 PROCEDURE — 96372 THER/PROPH/DIAG INJ SC/IM: CPT | Performed by: INTERNAL MEDICINE

## 2022-07-27 PROCEDURE — 99214 OFFICE O/P EST MOD 30 MIN: CPT | Performed by: INTERNAL MEDICINE

## 2022-07-27 RX ORDER — CEFTRIAXONE SODIUM 250 MG/1
250 INJECTION, POWDER, FOR SOLUTION INTRAMUSCULAR; INTRAVENOUS ONCE
Status: COMPLETED | OUTPATIENT
Start: 2022-07-27 | End: 2022-07-27

## 2022-07-27 RX ORDER — CEFTRIAXONE 500 MG/1
1000 INJECTION, POWDER, FOR SOLUTION INTRAMUSCULAR; INTRAVENOUS ONCE
Status: SHIPPED | OUTPATIENT
Start: 2022-07-27

## 2022-07-27 RX ORDER — SULFAMETHOXAZOLE AND TRIMETHOPRIM 800; 160 MG/1; MG/1
1 TABLET ORAL 2 TIMES DAILY
Qty: 6 TABLET | Refills: 0 | Status: SHIPPED | OUTPATIENT
Start: 2022-07-27 | End: 2022-07-30

## 2022-07-27 RX ADMIN — CEFTRIAXONE SODIUM 250 MG: 250 INJECTION, POWDER, FOR SOLUTION INTRAMUSCULAR; INTRAVENOUS at 17:29

## 2022-07-27 RX ADMIN — CEFTRIAXONE SODIUM 250 MG: 250 INJECTION, POWDER, FOR SOLUTION INTRAMUSCULAR; INTRAVENOUS at 17:31

## 2022-07-27 RX ADMIN — CEFTRIAXONE SODIUM 250 MG: 250 INJECTION, POWDER, FOR SOLUTION INTRAMUSCULAR; INTRAVENOUS at 17:30

## 2022-07-27 RX ADMIN — CEFTRIAXONE SODIUM 250 MG: 250 INJECTION, POWDER, FOR SOLUTION INTRAMUSCULAR; INTRAVENOUS at 17:28

## 2022-07-27 NOTE — PROGRESS NOTES
TELEHEALTH EVALUATION -- Audio/Visual (During QTFHS-15 public health emergency)      Kavita Keys  1955    Patient location: Patient Donna Zimmer is a 74 y.o. pleasant lady evaluated via video on 7/27/22       Consent:  She and/or health care decision maker is aware that that she may receive a bill for this virtual visit service, depending on her insurance coverage, and has provided verbal consent to proceed: Yes      History of Present Illness:  Kavita Keys is a 74 y.o. pleasant lady who has requested an audio/video evaluation for the following concern(s): complicated UTI. She has a past medical history significant for:  HL (LDL 30, TG 47 on 5/4/2022), not on statin   OA   ANA, on CPAP  Bell's palsy L sided (2018)   COVID-19 (12/2020, ?12/2021)  S/p tubal ligation   Former smoker (quit 1974)   On multiple supplements through [employer]     # Patient with UTI. Started Bactrim yesterday. Initially did not tolerate but today able to. Having fevers. Decreased PO intake. Nausea. Headache. Home COVID-19 test today negative. Health maintenance:   Health Maintenance Due   Topic Date Due    Annual Wellness Visit (AWV)  Never done    HIV screen  Never done    Hepatitis C screen  Never done    DTaP/Tdap/Td vaccine (1 - Tdap) Never done    Colorectal Cancer Screen  Never done    Shingles vaccine (1 of 2) Never done    DEXA (modify frequency per FRAX score)  Never done    COVID-19 Vaccine (2 - Booster for Adonay series) 05/04/2021    Pneumococcal 65+ years Vaccine (1 - PCV) Never done         Review of Systems:  Constitutional: fever, nausea, headache, urinary symptoms      Physical Exam:  Due to this being a TeleHealth encounter, evaluation of the following organ systems is limited: Vitals/Constitutional/EENT/Resp/CV/GI//MSK/Neuro/Skin/Heme-Lymph-Imm. General: alert, awake, and oriented to time, place, person, and situation. Not in acute distress. Not toxic appearing.  Mood appears normal   Skin: no jaundice, no rash on visible skin  Head: appears grossly normocephalic/atraumatic  Eyes: anicteric sclera, extraocular movements are intact   Oropharynx: lips are not cyanotic  Lungs: breathing appears normal, does not appear tachypneic, does not appear labored  Abdomen: abdomen does not appear to be distended  Extremities: no swelling noted in bilateral lower extremities  MSK: full ROM in all 4 extremities. No joint swelling or erythema noted   Neuro: gait normal, CN II-XII grossly intact, motor power grossly intact in all 4 extremities      Labs   I have personally reviewed labs, and discussed pertinent findings with patient on this date 7/27/2022     Imaging   I have personally reviewed imaging, and discussed pertinent findings with patient on this date 7/27/2022     Other notes  I have personally reviewed other notes, and discussed pertinent findings with patient on this date 7/27/2022       Assessment/Plan:     1. Complicated UTI (urinary tract infection)  Concern for acute pyelonephritis  Since she is allergic to PCN and Quinolone, will give 1 dose Ceftriaxone (low possibility for cross-reactivity with PCN, juan carlos since reaction was hives and not anaphylaxis) and prolong course of Bactrim to 10 days total   - cefTRIAXone (ROCEPHIN) injection 1,000 mg      Care discussed with patient and questions answered. Patient verbalizes understanding and agrees with plan. Discussed with patient the importance of continuity of care. I encouraged patient to schedule next appointment within 2 months (already scheduled) with me. I reviewed and reconciled the medications this visit. I reviewed and updated the past medical, surgical, social, and family history during this visit.      Pursuant to the emergency declaration under the [facility], [address] [place] [address] and the [employer] Act, this Virtual Visit was conducted, with patient's consent, to reduce the patient's risk of exposure to COVID-19 and provide continuity of care for an established patient. Services were provided through a video synchronous discussion virtually to substitute for in-person clinic visit.       Tracey Gutiérrez MD  Internal Medicine  7/27/2022   4:06 PM

## 2022-07-28 LAB
ORGANISM: ABNORMAL
URINE CULTURE, ROUTINE: ABNORMAL

## 2022-07-29 ENCOUNTER — TELEPHONE (OUTPATIENT)
Dept: INTERNAL MEDICINE CLINIC | Age: 67
End: 2022-07-29

## 2022-07-29 DIAGNOSIS — R13.12 OROPHARYNGEAL DYSPHAGIA: ICD-10-CM

## 2022-07-29 DIAGNOSIS — R22.1 NECK SWELLING: Primary | ICD-10-CM

## 2022-08-03 ENCOUNTER — HOSPITAL ENCOUNTER (OUTPATIENT)
Age: 67
Discharge: HOME | End: 2022-08-03
Payer: MEDICARE

## 2022-08-03 DIAGNOSIS — R13.12 OROPHARYNGEAL DYSPHAGIA: ICD-10-CM

## 2022-08-03 DIAGNOSIS — R22.1 NECK SWELLING: ICD-10-CM

## 2022-08-03 LAB
ALBUMIN SERPL-MCNC: 4.3 GM/DL (ref 3.4–5)
ALP BLD-CCNC: 52 IU/L (ref 40–128)
ALT SERPL-CCNC: 20 U/L (ref 10–40)
ANION GAP SERPL CALCULATED.3IONS-SCNC: 11 MMOL/L (ref 4–16)
AST SERPL-CCNC: 19 IU/L (ref 15–37)
BILIRUB SERPL-MCNC: 0.4 MG/DL (ref 0–1)
BUN BLDV-MCNC: 8 MG/DL (ref 6–23)
CALCIUM SERPL-MCNC: 9.2 MG/DL (ref 8.3–10.6)
CHLORIDE BLD-SCNC: 104 MMOL/L (ref 99–110)
CO2: 25 MMOL/L (ref 21–32)
CREAT SERPL-MCNC: 0.8 MG/DL (ref 0.6–1.1)
GFR AFRICAN AMERICAN: >60 ML/MIN/1.73M2
GFR NON-AFRICAN AMERICAN: >60 ML/MIN/1.73M2
GLUCOSE BLD-MCNC: 103 MG/DL (ref 70–99)
POTASSIUM SERPL-SCNC: 3.9 MMOL/L (ref 3.5–5.1)
SODIUM BLD-SCNC: 140 MMOL/L (ref 135–145)
TOTAL PROTEIN: 7.3 GM/DL (ref 6.4–8.2)

## 2022-08-03 PROCEDURE — 36415 COLL VENOUS BLD VENIPUNCTURE: CPT

## 2022-08-03 PROCEDURE — 80053 COMPREHEN METABOLIC PANEL: CPT

## 2022-08-04 ENCOUNTER — HOSPITAL ENCOUNTER (OUTPATIENT)
Dept: CT IMAGING | Age: 67
Discharge: HOME | End: 2022-08-04
Payer: MEDICARE

## 2022-08-04 DIAGNOSIS — R22.1 NECK SWELLING: ICD-10-CM

## 2022-08-04 DIAGNOSIS — R13.12 OROPHARYNGEAL DYSPHAGIA: ICD-10-CM

## 2022-08-04 PROCEDURE — 6360000004 HC RX CONTRAST MEDICATION: Performed by: INTERNAL MEDICINE

## 2022-08-04 PROCEDURE — 70491 CT SOFT TISSUE NECK W/DYE: CPT

## 2022-08-04 RX ORDER — SODIUM CHLORIDE 0.9 % (FLUSH) 0.9 %
5-40 SYRINGE (ML) INJECTION PRN
Status: DISCONTINUED | OUTPATIENT
Start: 2022-08-04 | End: 2022-08-05 | Stop reason: HOSPADM

## 2022-08-04 RX ADMIN — IOPAMIDOL 75 ML: 755 INJECTION, SOLUTION INTRAVENOUS at 09:30

## 2022-08-07 ENCOUNTER — HOSPITAL ENCOUNTER (EMERGENCY)
Age: 67
Discharge: HOME | End: 2022-08-07
Attending: EMERGENCY MEDICINE
Payer: MEDICARE

## 2022-08-07 VITALS
DIASTOLIC BLOOD PRESSURE: 70 MMHG | HEIGHT: 66 IN | RESPIRATION RATE: 21 BRPM | WEIGHT: 160 LBS | BODY MASS INDEX: 25.71 KG/M2 | HEART RATE: 75 BPM | SYSTOLIC BLOOD PRESSURE: 127 MMHG | OXYGEN SATURATION: 95 % | TEMPERATURE: 98.9 F

## 2022-08-07 DIAGNOSIS — L50.9 URTICARIA: ICD-10-CM

## 2022-08-07 DIAGNOSIS — K14.8 TONGUE EDEMA: ICD-10-CM

## 2022-08-07 DIAGNOSIS — T78.3XXA ANGIOEDEMA OF LIPS, INITIAL ENCOUNTER: ICD-10-CM

## 2022-08-07 DIAGNOSIS — T78.2XXA ANAPHYLAXIS, INITIAL ENCOUNTER: Primary | ICD-10-CM

## 2022-08-07 PROCEDURE — A4216 STERILE WATER/SALINE, 10 ML: HCPCS | Performed by: EMERGENCY MEDICINE

## 2022-08-07 PROCEDURE — 99284 EMERGENCY DEPT VISIT MOD MDM: CPT

## 2022-08-07 PROCEDURE — 96374 THER/PROPH/DIAG INJ IV PUSH: CPT

## 2022-08-07 PROCEDURE — 6360000002 HC RX W HCPCS: Performed by: EMERGENCY MEDICINE

## 2022-08-07 PROCEDURE — 2580000003 HC RX 258: Performed by: EMERGENCY MEDICINE

## 2022-08-07 PROCEDURE — 2500000003 HC RX 250 WO HCPCS: Performed by: EMERGENCY MEDICINE

## 2022-08-07 PROCEDURE — 96375 TX/PRO/DX INJ NEW DRUG ADDON: CPT

## 2022-08-07 PROCEDURE — 96372 THER/PROPH/DIAG INJ SC/IM: CPT

## 2022-08-07 RX ORDER — EPINEPHRINE 1 MG/ML
0.3 INJECTION, SOLUTION, CONCENTRATE INTRAVENOUS ONCE
Status: COMPLETED | OUTPATIENT
Start: 2022-08-07 | End: 2022-08-07

## 2022-08-07 RX ORDER — FAMOTIDINE 20 MG/1
20 TABLET, FILM COATED ORAL 2 TIMES DAILY
Qty: 8 TABLET | Refills: 0 | Status: SHIPPED | OUTPATIENT
Start: 2022-08-08 | End: 2022-08-12

## 2022-08-07 RX ORDER — PREDNISONE 10 MG/1
40 TABLET ORAL DAILY
Qty: 16 TABLET | Refills: 0 | Status: SHIPPED | OUTPATIENT
Start: 2022-08-08 | End: 2022-08-12

## 2022-08-07 RX ORDER — EPINEPHRINE 0.3 MG/.3ML
0.3 INJECTION SUBCUTANEOUS ONCE
Qty: 0.3 ML | Refills: 0 | Status: SHIPPED | OUTPATIENT
Start: 2022-08-07 | End: 2022-08-07

## 2022-08-07 RX ORDER — METHYLPREDNISOLONE SODIUM SUCCINATE 125 MG/2ML
125 INJECTION, POWDER, LYOPHILIZED, FOR SOLUTION INTRAMUSCULAR; INTRAVENOUS ONCE
Status: COMPLETED | OUTPATIENT
Start: 2022-08-07 | End: 2022-08-07

## 2022-08-07 RX ADMIN — FAMOTIDINE 20 MG: 10 INJECTION, SOLUTION INTRAVENOUS at 08:04

## 2022-08-07 RX ADMIN — METHYLPREDNISOLONE SODIUM SUCCINATE 125 MG: 125 INJECTION, POWDER, FOR SOLUTION INTRAMUSCULAR; INTRAVENOUS at 08:03

## 2022-08-07 RX ADMIN — EPINEPHRINE 0.3 MG: 1 INJECTION, SOLUTION, CONCENTRATE INTRAVENOUS at 08:04

## 2022-08-07 ASSESSMENT — PAIN - FUNCTIONAL ASSESSMENT: PAIN_FUNCTIONAL_ASSESSMENT: NONE - DENIES PAIN

## 2022-08-07 NOTE — ED PROVIDER NOTES
Triage Chief Complaint:   Facial Swelling (Facial swelling started yesterday. Patient's states she has bumps that look like bug bites.)    [place]:  Cinda Cousin is a 74 y.o. female that presents with a itchy rash, facial swelling and tongue swelling. Patient was in baseline state of health until the past few days where she has noticed small bumps that she thought were bug bites. Yesterday patient noticed some upper lip swelling that seemed to improve throughout the day with several doses of Benadryl. Overnight into the morning she noticed increased facial swelling bilaterally into the lower lip as well as a rash along her bra line that was very itchy. No pain at the bumps or rash. Patient took Benadryl at approximately 3:00 this morning. A little bit later on this morning she developed some tongue swelling which prompted ED visit. Patient is never had a problem like this before. Patient does report recent Bactrim that finished last week but she is tolerated that medication in the past.  Additionally, patient has switched to a new detergent and she thinks that might have been the culprit. Furthermore, patient has been helping someone get ready to move and thinks she may have been around some bugs because of this. Patient has not noticed any swollen lymph nodes. Patient denies anyone else around her with similar symptoms. Of note, patient has been undergoing evaluation for difficulty swallowing which she has had for a long time. Patient just had a CT of her neck soft tissues and this was negative. Patient reports that they are going to do barium swallow study likely next.     ROS:  General:  No fevers, no chills, no weakness  Eyes:  No recent vison changes, no discharge  ENT:  No sore throat, no nasal congestion, no hearing changes, + tongue swelling, + lip swelling  Cardiovascular:  No chest pain, no palpitations  Respiratory:  No shortness of breath, no cough, no wheezing  Gastrointestinal:  No pain, no nausea, no vomiting, no diarrhea  Musculoskeletal:  No muscle pain, no joint pain  Skin:  + rash, + pruritis, no easy bruising  Neurologic:  No speech problems, no headache, no extremity numbness, no extremity tingling, no extremity weakness  Psychiatric:  No anxiety  Genitourinary:  No dysuria, no hematuria  Endocrine:  No unexpected weight gain, no unexpected weight loss  Extremities:  no edema, no pain    Past Medical History:   Diagnosis Date    H/O Bell's palsy     H/O exercise stress test 10/03/2014    Negative    Menopause     Rosacea     Sleep apnea     uses C-Pap     Past Surgical History:   Procedure Laterality Date    CYST REMOVAL Left     ear    TUBAL LIGATION       Family History   Problem Relation Age of Onset    Coronary Art Dis Mother     Emphysema Mother     Hypertension Father         Brain Aneurysm    Hypertension Sister         sisters x 2    Stroke Maternal Grandmother     Colon Cancer Maternal Grandfather     Stroke Paternal Grandfather     Breast Cancer Neg Hx      Social History     Socioeconomic History    Marital status:      Spouse name: Not on file    Number of children: Not on file    Years of education: Not on file    Highest education level: Not on file   Occupational History    Not on file   Tobacco Use    Smoking status: Former     Packs/day: 0.25     Years: 3.00     Pack years: 0.75     Types: Cigarettes     Start date: 1971     Quit date: 1974     Years since quittin.6    Smokeless tobacco: Never   Vaping Use    Vaping Use: Never used   Substance and Sexual Activity    Alcohol use: Yes     Comment: one or less a day    Drug use: Never    Sexual activity: Not on file   Other Topics Concern    Not on file   Social History Narrative    Not on file     Social Determinants of Health     Financial Resource Strain: Low Risk     Difficulty of Paying Living Expenses: Not hard at all   Food Insecurity: No Food Insecurity    Worried About Running Out of Food in the Last Year: Never true    Ran Out of Food in the Last Year: Never true   Transportation Needs: Not on file   Physical Activity: Not on file   Stress: Not on file   Social Connections: Not on file   Intimate Partner Violence: Not on file   Housing Stability: Not on file     Current Facility-Administered Medications   Medication Dose Route Frequency Provider Last Rate Last Admin    cefTRIAXone (ROCEPHIN) injection 1,000 mg  1,000 mg IntraMUSCular Once Modesta Castorena MD         Current Outpatient Medications   Medication Sig Dispense Refill    b complex vitamins capsule Take 1 capsule by mouth daily      magnesium oxide (MAG-OX) 400 MG tablet Take 400 mg by mouth daily      vitamin E 1000 units capsule Take 1,000 Units by mouth daily      CALCIUM LACTATE PO Take by mouth      Cholecalciferol (VITAMIN D3) 125 MCG (5000 UT) TABS Take by mouth      Throat Lozenges (IMMUPLEX LOZENGE MT) Take by mouth      aspirin 81 MG chewable tablet Take 81 mg by mouth daily      UNABLE TO FIND neuroplex  Endocrine and nervous      UNABLE TO FIND Take 1 tablet by mouth 2 times daily Indications: NEUROPLEX      COD LIVER OIL PO Take by mouth OTC      Echinacea 400 MG CAPS Take 1 capsule by mouth daily      Garlic 2562 MG CAPS Take 1 tablet by mouth daily      melatonin 3 MG TABS tablet Take 3 mg by mouth nightly as needed      Turmeric 500 MG CAPS Take 2 tablets by mouth daily      diphenhydrAMINE (BENADRYL) 25 MG capsule Take 25 mg by mouth daily as needed for Itching. ibuprofen (ADVIL;MOTRIN) 200 MG tablet Take 200 mg by mouth daily as needed for Pain.        Allergies   Allergen Reactions    Azithromycin Hives    Levaquin [Levofloxacin In D5w]     Quinolones Hives    Penicillins Hives       Nursing Notes Reviewed    Physical Exam:  ED Triage Vitals [08/07/22 0720]   Enc Vitals Group      /85      Heart Rate 86      Resp 17      Temp 98.9 °F (37.2 °C)      Temp Source Oral      SpO2 96 %      Weight 160 lb (72.6 kg) SOFT TISSUE NECK W CONTRAST    Result Date: 8/4/2022  EXAMINATION: CT OF THE NECK SOFT TISSUE WITH CONTRAST  8/4/2022 TECHNIQUE: CT of the neck was performed with the administration of intravenous contrast. Multiplanar reformatted images are provided for review. Automated exposure control, iterative reconstruction, and/or weight based adjustment of the mA/kV was utilized to reduce the radiation dose to as low as reasonably achievable. COMPARISON: None. HISTORY: ORDERING SYSTEM PROVIDED HISTORY: Oropharyngeal dysphagia TECHNOLOGIST PROVIDED HISTORY: STAT Creatinine as needed:->Yes Reason for Exam: Oropharyngeal dysphagia, Neck swelling Additional signs and symptoms: patient states no palpable masses;  patient states she is having trouble with swallowing feeling like she is aspirating a lot;  a lot of sinus drainage;  patient states when she lays down she feels like her throat is closing and it is hard to breathe;  patient states this started about 3 years ago and has progressively gotten worse Relevant Medical/Surgical History: patient states no palpable masses; patient states she is having trouble with swallowing feeling like she is aspirating a lot;  a lot of sinus drainage;  patient states when she lays down she feels like her throat is closing and it is hard to breathe;  patient states this started about 3 years ago and has progressively gotten worse FINDINGS: There are calcified lymph nodes in the mediastinum and right hilum. The thyroid gland enhances homogeneously. No retropharyngeal free fluid is identified. Visualized paranasal sinuses are clear. The patient appears to be status post a canal wall up mastoidectomy on the left hand side. The nasopharynx, oropharynx, hypopharynx, larynx, and upper esophagus demonstrate no acute abnormality. There are dystrophic appearing calcifications within the right palatine tonsil.  The major salivary glands are symmetric in size and enhancement and visualized portions of the tongue are. The major vessels of the neck enhance symmetrically. No pathologically enlarged lymph nodes are detected within neck by imaging criteria. Moderate to severe degenerative disc disease is noted at C5-C6 and C6-C7. No acute abnormality of the soft tissue structures of the neck visualized. MDM:  Pt presents as above. Emergent conditions considered. Presentation prompted initial immediate evaluation. IVs established and patient is placed on monitor. Patient on arrival is protecting her airway and is without any significant glossal edema. Patient does not require intubation. Medications are given for presumed/suspected allergic reaction. IV Solu-Medrol and IV Pepcid and intramuscular epinephrine are given as patient with multisystem involvement with concern for possible anaphylaxis. Patient has already had Benadryl at home. ---  On my recheck of patient at approximately 854 this morning she reports that she is feeling much improved but still with some mild residual swelling. Rash is no longer itching. I do have plans to monitor for any rebound reaction until approximately 11:00. Patient is agreeable with this plan. Patient is requesting some to eat and drink which I believe is reasonable at this time given her clinical improvement. ---  Patient was observed for 3 hours following epinephrine injection and was with no rebound reaction. Patient presentation is consistent with likely anaphylaxis secondary to one of the several new exposures as described above. Patient is with continued improvement throughout ED course. Patient is appropriate for the outpatient follow-up. Scheduled Pepcid, Benadryl and a burst of prednisone provided for home. EpiPen is also prescribed and patient counseled on use and when to use. I discussed specific signs and symptoms on when to return to the emergency department as well as the need for close outpatient follow-up.   Questions sought and answered with the patient and . They voice understanding and agree with plan. Is this patient to be included in the SEP-1 Core Measure due to severe sepsis or septic shock? No   Exclusion criteria - the patient is NOT to be included for SEP-1 Core Measure due to: Infection is not suspected            Care of this patient occurred during the COVID-19 pandemic. Clinical Impression:  1. Anaphylaxis, initial encounter    2. Tongue edema    3. Angioedema of lips, initial encounter    4. Urticaria      Disposition referral (if applicable):  No follow-up provider specified. Disposition medications (if applicable):  New Prescriptions    No medications on file       Comment: Please note this report has been produced using speech recognition software and may contain errors related to that system including errors in grammar, punctuation, and spelling, as well as words and phrases that may be inappropriate. If there are any questions or concerns please feel free to contact the dictating provider for clarification.        Lennie Crump MD  08/07/22 2555

## 2022-08-10 ENCOUNTER — PATIENT MESSAGE (OUTPATIENT)
Dept: INTERNAL MEDICINE CLINIC | Age: 67
End: 2022-08-10

## 2022-08-10 RX ORDER — FLUCONAZOLE 150 MG/1
150 TABLET ORAL ONCE
Qty: 1 TABLET | Refills: 0 | Status: SHIPPED | OUTPATIENT
Start: 2022-08-10 | End: 2022-08-10

## 2022-08-10 NOTE — TELEPHONE ENCOUNTER
From: Candance Caffey  To: Dr. Minor Arizmendi  Sent: 8/10/2022 9:46 AM EDT  Subject: Yeast Infection    After taking the Bactrim, I have developed a yeast infection. I have also been experiencing an allergic reaction to something and went to ER on Sunday due to my face and tongue swelling. I was given epinephrine, steroids and Pepcid. Also advised to take Benadryl. Now I have a yeast infection from the antibiotic which is very common for me. Would you be able to call in rx to [facility] [address] for me and will this be ok to take along side above drugs? Thank you.  Emy Anderson

## 2022-08-15 ENCOUNTER — HOSPITAL ENCOUNTER (OUTPATIENT)
Age: 67
Discharge: HOME | End: 2022-08-15
Payer: MEDICARE

## 2022-08-15 ENCOUNTER — OFFICE VISIT (OUTPATIENT)
Dept: INTERNAL MEDICINE CLINIC | Age: 67
End: 2022-08-15
Payer: MEDICARE

## 2022-08-15 VITALS — SYSTOLIC BLOOD PRESSURE: 134 MMHG | OXYGEN SATURATION: 100 % | DIASTOLIC BLOOD PRESSURE: 72 MMHG | HEART RATE: 73 BPM

## 2022-08-15 DIAGNOSIS — T78.2XXD ANAPHYLAXIS, SUBSEQUENT ENCOUNTER: ICD-10-CM

## 2022-08-15 DIAGNOSIS — L50.8 OTHER URTICARIA: Primary | ICD-10-CM

## 2022-08-15 DIAGNOSIS — E07.9 THYROID DISORDER: ICD-10-CM

## 2022-08-15 DIAGNOSIS — R21 RASH: ICD-10-CM

## 2022-08-15 LAB
ALBUMIN SERPL-MCNC: 4.7 GM/DL (ref 3.4–5)
ALP BLD-CCNC: 48 IU/L (ref 40–129)
ALT SERPL-CCNC: 28 U/L (ref 10–40)
ANION GAP SERPL CALCULATED.3IONS-SCNC: 9 MMOL/L (ref 4–16)
AST SERPL-CCNC: 23 IU/L (ref 15–37)
BASOPHILS ABSOLUTE: 0.1 K/CU MM
BASOPHILS RELATIVE PERCENT: 1.1 % (ref 0–1)
BILIRUB SERPL-MCNC: 0.3 MG/DL (ref 0–1)
BUN BLDV-MCNC: 13 MG/DL (ref 6–23)
CALCIUM SERPL-MCNC: 9.6 MG/DL (ref 8.3–10.6)
CHLORIDE BLD-SCNC: 103 MMOL/L (ref 99–110)
CO2: 29 MMOL/L (ref 21–32)
CREAT SERPL-MCNC: 0.7 MG/DL (ref 0.6–1.1)
DIFFERENTIAL TYPE: ABNORMAL
EOSINOPHILS ABSOLUTE: 0.2 K/CU MM
EOSINOPHILS RELATIVE PERCENT: 2.7 % (ref 0–3)
ERYTHROCYTE SEDIMENTATION RATE: 2 MM/HR (ref 0–30)
GFR AFRICAN AMERICAN: >60 ML/MIN/1.73M2
GFR NON-AFRICAN AMERICAN: >60 ML/MIN/1.73M2
GLUCOSE BLD-MCNC: 97 MG/DL (ref 70–99)
HCT VFR BLD CALC: 42.5 % (ref 37–47)
HEMOGLOBIN: 13.8 GM/DL (ref 12.5–16)
IMMATURE NEUTROPHIL %: 0.7 % (ref 0–0.43)
LYMPHOCYTES ABSOLUTE: 3 K/CU MM
LYMPHOCYTES RELATIVE PERCENT: 33 % (ref 24–44)
MCH RBC QN AUTO: 29.7 PG (ref 27–31)
MCHC RBC AUTO-ENTMCNC: 32.5 % (ref 32–36)
MCV RBC AUTO: 91.6 FL (ref 78–100)
MONOCYTES ABSOLUTE: 0.7 K/CU MM
MONOCYTES RELATIVE PERCENT: 7.7 % (ref 0–4)
PDW BLD-RTO: 14.3 % (ref 11.7–14.9)
PLATELET # BLD: 406 K/CU MM (ref 140–440)
PMV BLD AUTO: 9 FL (ref 7.5–11.1)
POTASSIUM SERPL-SCNC: 4 MMOL/L (ref 3.5–5.1)
RBC # BLD: 4.64 M/CU MM (ref 4.2–5.4)
SEGMENTED NEUTROPHILS ABSOLUTE COUNT: 5 K/CU MM
SEGMENTED NEUTROPHILS RELATIVE PERCENT: 54.8 % (ref 36–66)
SODIUM BLD-SCNC: 141 MMOL/L (ref 135–145)
TOTAL IMMATURE NEUTOROPHIL: 0.06 K/CU MM
TOTAL PROTEIN: 7.4 GM/DL (ref 6.4–8.2)
TSH HIGH SENSITIVITY: 2.2 UIU/ML (ref 0.27–4.2)
WBC # BLD: 9 K/CU MM (ref 4–10.5)

## 2022-08-15 PROCEDURE — 85025 COMPLETE CBC W/AUTO DIFF WBC: CPT

## 2022-08-15 PROCEDURE — 80053 COMPREHEN METABOLIC PANEL: CPT

## 2022-08-15 PROCEDURE — 85652 RBC SED RATE AUTOMATED: CPT

## 2022-08-15 PROCEDURE — 1123F ACP DISCUSS/DSCN MKR DOCD: CPT | Performed by: PHYSICIAN ASSISTANT

## 2022-08-15 PROCEDURE — 86160 COMPLEMENT ANTIGEN: CPT

## 2022-08-15 PROCEDURE — 36415 COLL VENOUS BLD VENIPUNCTURE: CPT

## 2022-08-15 PROCEDURE — 99214 OFFICE O/P EST MOD 30 MIN: CPT | Performed by: PHYSICIAN ASSISTANT

## 2022-08-15 PROCEDURE — 84443 ASSAY THYROID STIM HORMONE: CPT

## 2022-08-15 PROCEDURE — 86141 C-REACTIVE PROTEIN HS: CPT

## 2022-08-15 PROCEDURE — 84439 ASSAY OF FREE THYROXINE: CPT

## 2022-08-15 RX ORDER — FAMOTIDINE 20 MG/1
20 TABLET, FILM COATED ORAL 2 TIMES DAILY
Qty: 60 TABLET | Refills: 3 | Status: SHIPPED | OUTPATIENT
Start: 2022-08-15

## 2022-08-15 RX ORDER — TRIAMCINOLONE ACETONIDE 0.25 MG/G
CREAM TOPICAL
Qty: 1 EACH | Refills: 0 | Status: SHIPPED | OUTPATIENT
Start: 2022-08-15

## 2022-08-15 RX ORDER — MONTELUKAST SODIUM 10 MG/1
10 TABLET ORAL NIGHTLY
Qty: 30 TABLET | Refills: 3 | Status: SHIPPED | OUTPATIENT
Start: 2022-08-15

## 2022-08-15 ASSESSMENT — ENCOUNTER SYMPTOMS
WHEEZING: 0
COUGH: 0
SHORTNESS OF BREATH: 0
DIARRHEA: 0
SORE THROAT: 0
NAUSEA: 0
EYE REDNESS: 0
EYE DISCHARGE: 0
VOMITING: 0
ABDOMINAL PAIN: 0
BACK PAIN: 0
PHOTOPHOBIA: 0
RHINORRHEA: 0
EYE PAIN: 0
COLOR CHANGE: 0
BLOOD IN STOOL: 0
CHEST TIGHTNESS: 0
CONSTIPATION: 0

## 2022-08-15 NOTE — PROGRESS NOTES
Deborrah Favre (:  1955) is a 74 y.o. female,Established patient, here for evaluation of the following chief complaint(s):    Urticaria and Anxiety    This is my first patient encounter with Deborrah Favre; chart reviewed. SUBJECTIVE/OBJECTIVE:  HPI  Deborrah Favre is a pleasant 74 y.o. female presenting to clinic today for ER follow-up/anaphylaxis. Patient was seen in emergency department 2022 with tongue swelling, angioedema, urticaria. Patient was given steroid, epinephrine, Pepcid with improvement. Patient had exposure to possible bugs while cleaning old house of friend, new detergent, recent Bactrim and rocephin prior to onset of symptoms for UTI. Patient had CT with IV contrast completed 2022 which was negative. Had yeast infection and was also treated with diflucan. Patient completed course of outpatient prednisone 40 mg daily, continues with Pepcid. Patient reports ongoing waxing and waning urticarial hives on various parts of her body since ER visit; reports that she has been taking Benadryl intermittently and hives typically resolve after a few hours. Patient denies any wheezing, throat swelling, shortness of breath. Patient reports that hives are typically itchy. Patient reports area of left leg which has been there for the past month which is different in appearance and is chronically itchy and sometimes excoriated. CT SOFT TISSUE NECK W CONTRAST     Result Date: 2022  EXAMINATION: CT OF THE NECK SOFT TISSUE WITH CONTRAST  2022 TECHNIQUE: CT of the neck was performed with the administration of intravenous contrast. Multiplanar reformatted images are provided for review. Automated exposure control, iterative reconstruction, and/or weight based adjustment of the mA/kV was utilized to reduce the radiation dose to as low as reasonably achievable. COMPARISON: None.  HISTORY: ORDERING SYSTEM PROVIDED HISTORY: Oropharyngeal dysphagia TECHNOLOGIST PROVIDED HISTORY: STAT Creatinine as needed:->Yes Reason for Exam: Oropharyngeal dysphagia, Neck swelling Additional signs and symptoms: patient states no palpable masses;  patient states she is having trouble with swallowing feeling like she is aspirating a lot;  a lot of sinus drainage;  patient states when she lays down she feels like her throat is closing and it is hard to breathe;  patient states this started about 3 years ago and has progressively gotten worse Relevant Medical/Surgical History: patient states no palpable masses; patient states she is having trouble with swallowing feeling like she is aspirating a lot;  a lot of sinus drainage;  patient states when she lays down she feels like her throat is closing and it is hard to breathe;  patient states this started about 3 years ago and has progressively gotten worse FINDINGS: There are calcified lymph nodes in the mediastinum and right hilum. The thyroid gland enhances homogeneously. No retropharyngeal free fluid is identified. Visualized paranasal sinuses are clear. The patient appears to be status post a canal wall up mastoidectomy on the left hand side. The nasopharynx, oropharynx, hypopharynx, larynx, and upper esophagus demonstrate no acute abnormality. There are dystrophic appearing calcifications within the right palatine tonsil. The major salivary glands are symmetric in size and enhancement and visualized portions of the tongue are. The major vessels of the neck enhance symmetrically. No pathologically enlarged lymph nodes are detected within neck by imaging criteria. Moderate to severe degenerative disc disease is noted at C5-C6 and C6-C7. No acute abnormality of the soft tissue structures of the neck visualized.         Allergies   Allergen Reactions    Azithromycin Hives    Levaquin [Levofloxacin In D5w]     Quinolones Hives    Penicillins Hives       Current Outpatient Medications   Medication Sig Dispense Refill    montelukast (SINGULAIR) 10 MG tablet Take 1 tablet by mouth nightly 30 tablet 3    famotidine (PEPCID) 20 MG tablet Take 1 tablet by mouth in the morning and 1 tablet before bedtime. 60 tablet 3    triamcinolone (KENALOG) 0.025 % cream Apply topically 2 times daily. 1 each 0    b complex vitamins capsule Take 1 capsule by mouth daily      magnesium oxide (MAG-OX) 400 MG tablet Take 400 mg by mouth daily      vitamin E 1000 units capsule Take 1,000 Units by mouth daily      CALCIUM LACTATE PO Take by mouth      Cholecalciferol (VITAMIN D3) 125 MCG (5000 UT) TABS Take by mouth      Throat Lozenges (IMMUPLEX LOZENGE MT) Take by mouth      aspirin 81 MG chewable tablet Take 81 mg by mouth daily      UNABLE TO FIND neuroplex  Endocrine and nervous      UNABLE TO FIND Take 1 tablet by mouth 2 times daily Indications: NEUROPLEX      COD LIVER OIL PO Take by mouth OTC      Echinacea 400 MG CAPS Take 1 capsule by mouth daily      Garlic 9200 MG CAPS Take 1 tablet by mouth daily      melatonin 3 MG TABS tablet Take 3 mg by mouth nightly as needed      Turmeric 500 MG CAPS Take 2 tablets by mouth daily      diphenhydrAMINE (BENADRYL) 25 MG capsule Take 25 mg by mouth daily as needed for Itching. ibuprofen (ADVIL;MOTRIN) 200 MG tablet Take 200 mg by mouth daily as needed for Pain.      famotidine (PEPCID) 20 MG tablet Take 1 tablet by mouth in the morning and 1 tablet before bedtime. Do all this for 4 days. 8 tablet 0    EPINEPHrine (EPIPEN 2-ROSMERY) 0.3 MG/0.3ML SOAJ injection Inject 0.3 mLs into the muscle once for 1 dose Use as directed for severe allergic reaction 0.3 mL 0     Current Facility-Administered Medications   Medication Dose Route Frequency Provider Last Rate Last Admin    cefTRIAXone (ROCEPHIN) injection 1,000 mg  1,000 mg IntraMUSCular Once Tran Bruner MD           /72   Pulse 73   SpO2 100%     Review of Systems   Constitutional:  Negative for appetite change, chills, fatigue and fever.    HENT:  Negative for congestion, ear pain, hearing loss, rhinorrhea and sore throat. Eyes:  Negative for photophobia, pain, discharge and redness. Respiratory:  Negative for cough, chest tightness, shortness of breath and wheezing. Cardiovascular:  Negative for chest pain, palpitations and leg swelling. Gastrointestinal:  Negative for abdominal pain, blood in stool, constipation, diarrhea, nausea and vomiting. Endocrine: Negative for polyuria. Genitourinary:  Negative for difficulty urinating, dysuria, flank pain, frequency, hematuria and urgency. Musculoskeletal:  Negative for arthralgias, back pain, gait problem and joint swelling. Skin:  Positive for rash. Negative for color change. Neurological:  Negative for dizziness, syncope, weakness, light-headedness and headaches. Hematological:  Negative for adenopathy. Psychiatric/Behavioral:  Negative for agitation, behavioral problems and suicidal ideas. The patient is not nervous/anxious. Physical Exam  Constitutional:       General: She is not in acute distress. Appearance: She is not ill-appearing, toxic-appearing or diaphoretic. HENT:      Head: Normocephalic and atraumatic. Right Ear: External ear normal.      Left Ear: External ear normal.      Nose: No congestion or rhinorrhea. Mouth/Throat:      Mouth: Mucous membranes are moist.      Pharynx: Oropharynx is clear. No oropharyngeal exudate or posterior oropharyngeal erythema. Eyes:      Extraocular Movements: Extraocular movements intact. Pupils: Pupils are equal, round, and reactive to light. Neck:      Vascular: No carotid bruit. Cardiovascular:      Rate and Rhythm: Normal rate and regular rhythm. Pulses: Normal pulses. Pulmonary:      Effort: No respiratory distress. Musculoskeletal:         General: Normal range of motion. Cervical back: Normal range of motion and neck supple. No rigidity or tenderness. Lymphadenopathy:      Cervical: No cervical adenopathy.    Skin: General: Skin is warm and dry. Comments: No active urticarial hives at time of exam; left medial knee shows mildly erythematous circular area about 2 inches in diameter; no discharge; small bug bite to left upper arm. Neurological:      General: No focal deficit present. Mental Status: She is alert and oriented to person, place, and time. Mental status is at baseline. Psychiatric:         Behavior: Behavior normal.       ASSESSMENT/PLAN:  1. Other urticaria   -Multiple contributing factors to possible allergic reaction/possible chronic spontaneous urticaria; intermittent use of antihistamines, stress, recent antibiotic/infection, change in detergents, new dirty environment and exposure to bugs; bug bites; CT with IV contrast etc.  Continue daily Pepcid; Zyrtec, Singulair; can use Benadryl as needed etc.; referral placed to allergist; check labs; return to ED if anaphylaxis symptoms return; carry EpiPen etc.  -     C4 COMPLEMENT; Future  -     C-Reactive Protein; Future  -     Sedimentation Rate; Future  -     CBC with Auto Differential; Future  -     TSH with Reflex to FT4; Future  -     Comprehensive Metabolic Panel; Future  -     External Referral To Allergy  -     montelukast (SINGULAIR) 10 MG tablet; Take 1 tablet by mouth nightly, Disp-30 tablet, R-3Normal  -     famotidine (PEPCID) 20 MG tablet; Take 1 tablet by mouth in the morning and 1 tablet before bedtime. , Disp-60 tablet, R-3Normal  2. Anaphylaxis, subsequent encounter  -     C4 COMPLEMENT; Future  -     C-Reactive Protein; Future  -     Sedimentation Rate; Future  -     CBC with Auto Differential; Future  -     TSH with Reflex to FT4; Future  -     Comprehensive Metabolic Panel; Future  -     External Referral To Allergy  3. Rash   -Possible eczema to left lower extremity; trial topical steroid. Reviewed proper use, side effects. -     triamcinolone (KENALOG) 0.025 % cream; Apply topically 2 times daily. , Disp-1 each, R-0, Normal  4. Thyroid disorder  -     TSH with Reflex to FT4; Future    Return in about 2 weeks (around 8/29/2022), or if symptoms worsen or fail to improve, for Follow Up. An electronic signature was used to authenticate this note.     --MAUREEN Jackson

## 2022-08-16 LAB
HIGH SENSITIVE C-REACTIVE PROTEIN: 0.7 MG/L
T4 FREE: 1.09 NG/DL (ref 0.9–1.8)

## 2022-08-16 NOTE — RESULT ENCOUNTER NOTE
Called patient to review labs; labs are reassuring at this time; f/u with allergist, continue antihistamine regimen for now; return to clinic or report to ED for changes

## 2022-08-18 LAB — COMPLEMENT C4: 22 MG/DL (ref 10–40)

## 2022-09-15 ENCOUNTER — COMMUNITY OUTREACH (OUTPATIENT)
Dept: INTERNAL MEDICINE CLINIC | Age: 67
End: 2022-09-15

## 2023-04-18 ENCOUNTER — OFFICE VISIT (OUTPATIENT)
Dept: FAMILY MEDICINE CLINIC | Age: 68
End: 2023-04-18
Payer: MEDICARE

## 2023-04-18 VITALS
BODY MASS INDEX: 29.02 KG/M2 | HEART RATE: 75 BPM | OXYGEN SATURATION: 95 % | TEMPERATURE: 98.3 F | DIASTOLIC BLOOD PRESSURE: 82 MMHG | WEIGHT: 179.8 LBS | SYSTOLIC BLOOD PRESSURE: 120 MMHG

## 2023-04-18 DIAGNOSIS — R21 RASH: Primary | ICD-10-CM

## 2023-04-18 PROCEDURE — 99213 OFFICE O/P EST LOW 20 MIN: CPT | Performed by: NURSE PRACTITIONER

## 2023-04-18 PROCEDURE — 1123F ACP DISCUSS/DSCN MKR DOCD: CPT | Performed by: NURSE PRACTITIONER

## 2023-04-18 RX ORDER — TRIAMCINOLONE ACETONIDE 1 MG/G
CREAM TOPICAL
Qty: 45 G | Refills: 0 | Status: SHIPPED | OUTPATIENT
Start: 2023-04-18

## 2023-04-18 ASSESSMENT — ENCOUNTER SYMPTOMS
SINUS PRESSURE: 0
SINUS PAIN: 0
NAIL CHANGES: 0
COUGH: 0
CHEST TIGHTNESS: 0
WHEEZING: 0
RHINORRHEA: 0
SHORTNESS OF BREATH: 0
SORE THROAT: 0
VOMITING: 0
EYE PAIN: 0
NAUSEA: 0
DIARRHEA: 0

## 2023-04-18 NOTE — PROGRESS NOTES
Alan Levy   79 y.o.  female  [id number]      Chief Complaint   Patient presents with    Rash        Subjective:  79 y. o.female is here for a follow up. She has the following chronic/acute medical problems:  Patient Active Problem List   Diagnosis    H/O Bell's palsy    ANA on CPAP    Vaginal dryness       Rash  This is a new problem. The current episode started yesterday. The problem is unchanged. The affected locations include the back and left lower leg. The rash is characterized by redness and itchiness. She was exposed to nothing. Pertinent negatives include no anorexia, congestion, cough, diarrhea, eye pain, facial edema, fatigue, fever, joint pain, nail changes, rhinorrhea, shortness of breath, sore throat or vomiting. Past treatments include nothing. Review of Systems   Constitutional:  Negative for appetite change, chills, fatigue and fever. HENT:  Negative for congestion, ear pain, postnasal drip, rhinorrhea, sinus pressure, sinus pain, sneezing and sore throat. Eyes:  Negative for pain. Respiratory:  Negative for cough, chest tightness, shortness of breath and wheezing. Cardiovascular:  Negative for chest pain and palpitations. Gastrointestinal:  Negative for anorexia, diarrhea, nausea and vomiting. Musculoskeletal:  Negative for joint pain. Skin:  Positive for rash. Negative for nail changes. Neurological:  Negative for dizziness, light-headedness and headaches. Current Outpatient Medications   Medication Sig Dispense Refill    triamcinolone (KENALOG) 0.1 % cream Apply topically 2 times daily.  45 g 0    b complex vitamins capsule Take 1 capsule by mouth daily as needed      magnesium oxide (MAG-OX) 400 MG tablet Take 1 tablet by mouth daily as needed      CALCIUM LACTATE PO Take by mouth as needed      Cholecalciferol (VITAMIN D3) 125 MCG (5000 UT) TABS Take by mouth      aspirin 81 MG chewable tablet Take 1 tablet by mouth daily      UNABLE TO FIND Take 1 tablet by

## 2023-05-15 ENCOUNTER — OFFICE VISIT (OUTPATIENT)
Dept: FAMILY MEDICINE CLINIC | Age: 68
End: 2023-05-15
Payer: MEDICARE

## 2023-05-15 VITALS
WEIGHT: 179 LBS | OXYGEN SATURATION: 96 % | TEMPERATURE: 97.3 F | HEART RATE: 82 BPM | HEIGHT: 66 IN | BODY MASS INDEX: 28.77 KG/M2

## 2023-05-15 DIAGNOSIS — J02.9 ACUTE PHARYNGITIS, UNSPECIFIED ETIOLOGY: Primary | ICD-10-CM

## 2023-05-15 DIAGNOSIS — R07.81 RIB PAIN ON RIGHT SIDE: ICD-10-CM

## 2023-05-15 LAB — S PYO AG THROAT QL: NORMAL

## 2023-05-15 PROCEDURE — 87880 STREP A ASSAY W/OPTIC: CPT | Performed by: NURSE PRACTITIONER

## 2023-05-15 PROCEDURE — 1123F ACP DISCUSS/DSCN MKR DOCD: CPT | Performed by: NURSE PRACTITIONER

## 2023-05-15 PROCEDURE — 99213 OFFICE O/P EST LOW 20 MIN: CPT | Performed by: NURSE PRACTITIONER

## 2023-05-15 RX ORDER — METHYLPREDNISOLONE 4 MG/1
TABLET ORAL
Qty: 1 KIT | Refills: 0 | Status: SHIPPED | OUTPATIENT
Start: 2023-05-15 | End: 2023-05-21

## 2023-05-15 NOTE — PROGRESS NOTES
5/15/23  Bal Arredondo  1955    FLU/COVID-19 CLINIC EVALUATION    HPI SYMPTOMS:    Employer:    [] Fevers  [] Chills  [x] Cough  [] Coughing up blood  [] Chest Congestion  [] Nasal Congestion  [] Feeling short of breath  [] Sometimes  [] Frequently  [] All the time  [] Chest pain  [x] Headaches  []Tolerable  [] Severe  [x] Sore throat  [] Muscle aches  [] Nausea  [] Vomiting  []Unable to keep fluids down  [] Diarrhea  []Severe    [x] OTHER SYMPTOMS:    Post nasal drainage    Symptom Duration:   [] 1  [] 2   [] 3   [x] 4    [] 5   [] 6   [] 7   [] 8   [] 9   [] 10   [] 11   [] 12   [] 13   [] 14   [] Longer than 14 days    Symptom course:   [x] Worsening     [] Stable     [] Improving    RISK FACTORS:    [] Pregnant or possibly pregnant  [x] Age over 61  [] Diabetes  [] Heart disease  [] Asthma  [] COPD/Other chronic lung diseases  [] Active Cancer  [] On Chemotherapy  [] Taking oral steroids  [] History Lymphoma/Leukemia  [] Close contact with a lab confirmed COVID-19 patient within 14 days of symptom onset  [] History of travel from affected geographical areas within 14 days of symptom onset       VITALS:  There were no vitals filed for this visit. TESTS:    POCT FLU:  [] Positive     []Negative    ASSESSMENT:    [] Flu  [] Possible COVID-19  [] Strep    PLAN:    [] Discharge home with written instructions for:  [] Flu management  [] Possible COVID-19 infection with self-quarantine and management of symptoms  [] Follow-up with primary care physician or emergency department if worsens  [] Evaluation per physician/NP/PA in clinic  [] Sent to ER       An  electronic signature was used to authenticate this note.      --Josiane Client on 5/15/2023 at 8:24 AM
[Levofloxacin In D5w]     Quinolones Hives    Penicillins Hives   ,   Past Medical History:   Diagnosis Date    H/O Bell's palsy     H/O exercise stress test 10/03/2014    Negative    Menopause     Rosacea     Sleep apnea     uses C-Pap   ,   Past Surgical History:   Procedure Laterality Date    CYST REMOVAL Left     ear    TUBAL LIGATION     ,   Social History     Tobacco Use    Smoking status: Former     Packs/day: 0.25     Years: 3.00     Pack years: 0.75     Types: Cigarettes     Start date: 1971     Quit date: 1974     Years since quittin.4    Smokeless tobacco: Never   Vaping Use    Vaping Use: Never used   Substance Use Topics    Alcohol use: Yes     Comment: one or less a day    Drug use: Never   ,   Family History   Problem Relation Age of Onset    Coronary Art Dis Mother     Emphysema Mother     Hypertension Father         Brain Aneurysm    Hypertension Sister         sisters x 2    Stroke Maternal Grandmother     Colon Cancer Maternal Grandfather     Stroke Paternal Grandfather     Breast Cancer Neg Hx    ,   Immunization History   Administered Date(s) Administered    COVID-19, J&J, (age 18y+), IM, 0.5 mL 2021   ,   Health Maintenance   Topic Date Due    Hepatitis C screen  Never done    DTaP/Tdap/Td vaccine (1 - Tdap) Never done    Colorectal Cancer Screen  Never done    Shingles vaccine (1 of 2) Never done    DEXA (modify frequency per FRAX score)  Never done    COVID-19 Vaccine (2 - Booster for [employer] series) 2021    Annual Wellness Visit (AWV)  Never done    Pneumococcal 65+ years Vaccine (1 - PCV) Never done    Flu vaccine (Season Ended) 2023    Depression Screen  2024    Breast cancer screen  2024    Lipids  2027    Hepatitis A vaccine  Aged Out    Hib vaccine  Aged Out    Meningococcal (ACWY) vaccine  Aged Out    Diabetes screen  Discontinued    Cervical cancer screen  Discontinued       PHYSICAL EXAM:  Physical Exam  Constitutional:

## 2023-05-17 LAB — BACTERIA THROAT AEROBE CULT: NORMAL

## 2023-05-19 ENCOUNTER — HOSPITAL ENCOUNTER (OUTPATIENT)
Dept: WOMENS IMAGING | Age: 68
Discharge: HOME | End: 2023-05-19
Payer: MEDICARE

## 2023-05-19 DIAGNOSIS — Z12.31 ENCOUNTER FOR SCREENING MAMMOGRAM FOR MALIGNANT NEOPLASM OF BREAST: ICD-10-CM

## 2023-05-19 PROCEDURE — 77063 BREAST TOMOSYNTHESIS BI: CPT

## 2023-09-06 ENCOUNTER — COMMUNITY OUTREACH (OUTPATIENT)
Dept: INTERNAL MEDICINE CLINIC | Age: 68
End: 2023-09-06

## 2023-09-06 NOTE — PROGRESS NOTES
Patient's HM shows they are overdue for Colorectal Screening. Care Everywhere and  files searched. No results to attach to order nor HM updated. Spoke with staff at Dr. Aiyana Willaim office. The patient does not come up in their system.

## 2023-09-29 ENCOUNTER — TELEPHONE (OUTPATIENT)
Dept: FAMILY MEDICINE CLINIC | Age: 68
End: 2023-09-29

## 2023-09-29 NOTE — TELEPHONE ENCOUNTER
----- Message from Claudia Weir sent at 9/29/2023  3:45 PM EDT -----  Subject: Appointment Request    Reason for Call: New Patient/New to Provider Appointment needed:   Semi-Routine Skin Problem    QUESTIONS    Reason for appointment request? Available appointments did not meet   patient need     Additional Information for Provider? The patient called to schedule an   appointment to establish care.  The patient states she is having an itch   that will not stop once she start scratching and it is located on the   inside of her left calf.  ---------------------------------------------------------------------------  --------------  Thelma MARIE  [phone]; OK to leave message on voicemail  ---------------------------------------------------------------------------  --------------  SCRIPT ANSWERS

## 2023-09-29 NOTE — TELEPHONE ENCOUNTER
Called patient at home no answer LM VM that we had no openings today for the itching she is having. I did leave the number for the [facility] in [facility].

## 2024-01-09 ENCOUNTER — OFFICE VISIT (OUTPATIENT)
Dept: INTERNAL MEDICINE CLINIC | Age: 69
End: 2024-01-09
Payer: MEDICARE

## 2024-01-09 VITALS — TEMPERATURE: 98.9 F | OXYGEN SATURATION: 96 % | HEART RATE: 75 BPM

## 2024-01-09 DIAGNOSIS — R39.9 SYMPTOMS OF URINARY TRACT INFECTION: Primary | ICD-10-CM

## 2024-01-09 DIAGNOSIS — R21 RASH: ICD-10-CM

## 2024-01-09 DIAGNOSIS — Z86.19 HISTORY OF CANDIDIASIS OF VAGINA: ICD-10-CM

## 2024-01-09 DIAGNOSIS — R05.9 COUGH, UNSPECIFIED TYPE: ICD-10-CM

## 2024-01-09 LAB
BILIRUBIN, POC: ABNORMAL
BLOOD URINE, POC: ABNORMAL
CLARITY, POC: CLEAR
COLOR, POC: YELLOW
GLUCOSE URINE, POC: ABNORMAL
KETONES, POC: ABNORMAL
LEUKOCYTE EST, POC: ABNORMAL
NITRITE, POC: ABNORMAL
PH, POC: 6
PROTEIN, POC: ABNORMAL
SPECIFIC GRAVITY, POC: 1.01
UROBILINOGEN, POC: 0.2

## 2024-01-09 PROCEDURE — 1123F ACP DISCUSS/DSCN MKR DOCD: CPT | Performed by: PHYSICIAN ASSISTANT

## 2024-01-09 PROCEDURE — 81002 URINALYSIS NONAUTO W/O SCOPE: CPT | Performed by: PHYSICIAN ASSISTANT

## 2024-01-09 PROCEDURE — 99214 OFFICE O/P EST MOD 30 MIN: CPT | Performed by: PHYSICIAN ASSISTANT

## 2024-01-09 RX ORDER — NITROFURANTOIN 25; 75 MG/1; MG/1
100 CAPSULE ORAL 2 TIMES DAILY
Qty: 14 CAPSULE | Refills: 0 | Status: SHIPPED | OUTPATIENT
Start: 2024-01-09 | End: 2024-01-16

## 2024-01-09 RX ORDER — BENZONATATE 200 MG/1
200 CAPSULE ORAL 3 TIMES DAILY PRN
Qty: 30 CAPSULE | Refills: 0 | Status: SHIPPED | OUTPATIENT
Start: 2024-01-09 | End: 2024-01-16

## 2024-01-09 RX ORDER — PERMETHRIN 50 MG/G
CREAM TOPICAL
Qty: 30 G | Refills: 0 | Status: SHIPPED | OUTPATIENT
Start: 2024-01-09

## 2024-01-09 SDOH — ECONOMIC STABILITY: FOOD INSECURITY: WITHIN THE PAST 12 MONTHS, THE FOOD YOU BOUGHT JUST DIDN'T LAST AND YOU DIDN'T HAVE MONEY TO GET MORE.: NEVER TRUE

## 2024-01-09 SDOH — ECONOMIC STABILITY: FOOD INSECURITY: WITHIN THE PAST 12 MONTHS, YOU WORRIED THAT YOUR FOOD WOULD RUN OUT BEFORE YOU GOT MONEY TO BUY MORE.: NEVER TRUE

## 2024-01-09 SDOH — ECONOMIC STABILITY: HOUSING INSECURITY
IN THE LAST 12 MONTHS, WAS THERE A TIME WHEN YOU DID NOT HAVE A STEADY PLACE TO SLEEP OR SLEPT IN A SHELTER (INCLUDING NOW)?: NO

## 2024-01-09 SDOH — ECONOMIC STABILITY: INCOME INSECURITY: HOW HARD IS IT FOR YOU TO PAY FOR THE VERY BASICS LIKE FOOD, HOUSING, MEDICAL CARE, AND HEATING?: NOT VERY HARD

## 2024-01-09 SDOH — ECONOMIC STABILITY: TRANSPORTATION INSECURITY
IN THE PAST 12 MONTHS, HAS LACK OF TRANSPORTATION KEPT YOU FROM MEETINGS, WORK, OR FROM GETTING THINGS NEEDED FOR DAILY LIVING?: NO

## 2024-01-09 ASSESSMENT — ENCOUNTER SYMPTOMS
BLOOD IN STOOL: 0
BACK PAIN: 0
VOMITING: 0
EYE DISCHARGE: 0
CONSTIPATION: 0
RHINORRHEA: 0
SORE THROAT: 0
ABDOMINAL PAIN: 0
CHEST TIGHTNESS: 0
PHOTOPHOBIA: 0
COUGH: 1
WHEEZING: 0
EYE REDNESS: 0
SHORTNESS OF BREATH: 0
EYE PAIN: 0
NAUSEA: 0
DIARRHEA: 0
COLOR CHANGE: 0

## 2024-01-09 ASSESSMENT — PATIENT HEALTH QUESTIONNAIRE - PHQ9
SUM OF ALL RESPONSES TO PHQ9 QUESTIONS 1 & 2: 1
SUM OF ALL RESPONSES TO PHQ QUESTIONS 1-9: 1
1. LITTLE INTEREST OR PLEASURE IN DOING THINGS: SEVERAL DAYS
SUM OF ALL RESPONSES TO PHQ QUESTIONS 1-9: 1
1. LITTLE INTEREST OR PLEASURE IN DOING THINGS: 1
SUM OF ALL RESPONSES TO PHQ9 QUESTIONS 1 & 2: 1
2. FEELING DOWN, DEPRESSED OR HOPELESS: NOT AT ALL
2. FEELING DOWN, DEPRESSED OR HOPELESS: 0
SUM OF ALL RESPONSES TO PHQ QUESTIONS 1-9: 1
SUM OF ALL RESPONSES TO PHQ QUESTIONS 1-9: 1

## 2024-01-09 NOTE — PROGRESS NOTES
Mae Ordoñez (:  1955) is a 68 y.o. female,Established patient, here for evaluation of the following chief complaint(s):    Urinary Pain (Since ), Urinary Frequency, Cough (Started 2 weeks ago), Pharyngitis, and Rash (Left leg)      SUBJECTIVE/OBJECTIVE:  HPI  Mae Ordoñez is a pleasant 68 y.o. female presenting to clinic today for multiple medical complaints.    URI-patient reports ongoing URI symptoms for about the past 2 weeks; reports for the most part, she continues to have a nagging cough which is dry, reports slight postnasal drip, denies other associated symptoms; reports in general her symptoms seem to be improving.  Reports benefit with homeopathic medications.    UTI symptoms-patient reports UTI symptoms for the past approximate 2 to 3 days, reports worsening bladder fullness and pressure, increased urinary frequency and hesitancy; reports symptoms are consistent with prior UTIs.  Denies any fevers, flank pains, gross hematuria, nausea or vomiting.    Left leg rash-patient previously seen for rash to left leg, was initially prescribed Kenalog, this did not help.  Patient was referred to dermatology; patient reports dermatologist told to apply lotion which has not been helpful.  Patient reports intense itch which seems to come on at random, states it is not painful, reports occasional excoriations, denies bleeding or other drainage or wounds.          Allergies   Allergen Reactions    Azithromycin Hives    Levaquin [Levofloxacin In D5w]     Quinolones Hives    Penicillins Hives       Current Outpatient Medications   Medication Sig Dispense Refill    permethrin (ELIMITE) 5 % cream Apply topically as directed 30 g 0    benzonatate (TESSALON) 200 MG capsule Take 1 capsule by mouth 3 times daily as needed for Cough 30 capsule 0    nitrofurantoin, macrocrystal-monohydrate, (MACROBID) 100 MG capsule Take 1 capsule by mouth 2 times daily for 7 days 14 capsule 0    triamcinolone

## 2024-01-11 LAB — BACTERIA UR CULT: NORMAL

## 2024-01-11 RX ORDER — FLUCONAZOLE 150 MG/1
150 TABLET ORAL ONCE
Qty: 1 TABLET | Refills: 0 | Status: SHIPPED | OUTPATIENT
Start: 2024-01-11 | End: 2024-01-11

## 2024-01-11 NOTE — RESULT ENCOUNTER NOTE
Reviewed results with patient; patient reports symptoms are improving; can continue/complete course of antibiotic as prescribed, increase fluids etc.; Diflucan sent in if yeast infection symptoms develop. Return to clinic or report to emergency department if symptoms worsen, change, persist.

## 2024-01-29 ENCOUNTER — PATIENT MESSAGE (OUTPATIENT)
Dept: INTERNAL MEDICINE CLINIC | Age: 69
End: 2024-01-29

## 2024-01-29 DIAGNOSIS — R05.9 COUGH, UNSPECIFIED TYPE: Primary | ICD-10-CM

## 2024-01-29 RX ORDER — BENZONATATE 200 MG/1
200 CAPSULE ORAL 3 TIMES DAILY PRN
Qty: 30 CAPSULE | Refills: 0 | Status: SHIPPED | OUTPATIENT
Start: 2024-01-29 | End: 2024-02-05

## 2024-01-29 NOTE — TELEPHONE ENCOUNTER
From: Mae Ordoñez  To: Godfrey Linares  Sent: 1/29/2024 8:39 AM EST  Subject: Refill on Benzonatate    Godfrey, still coughing and wondered if you could refill this Rx please?

## 2024-04-29 SDOH — HEALTH STABILITY: PHYSICAL HEALTH: ON AVERAGE, HOW MANY DAYS PER WEEK DO YOU ENGAGE IN MODERATE TO STRENUOUS EXERCISE (LIKE A BRISK WALK)?: 1 DAY

## 2024-04-29 SDOH — HEALTH STABILITY: PHYSICAL HEALTH: ON AVERAGE, HOW MANY MINUTES DO YOU ENGAGE IN EXERCISE AT THIS LEVEL?: 20 MIN

## 2024-05-02 ENCOUNTER — OFFICE VISIT (OUTPATIENT)
Age: 69
End: 2024-05-02

## 2024-05-02 VITALS — WEIGHT: 180 LBS | BODY MASS INDEX: 28.93 KG/M2 | HEIGHT: 66 IN | RESPIRATION RATE: 18 BRPM

## 2024-05-02 VITALS
WEIGHT: 180 LBS | DIASTOLIC BLOOD PRESSURE: 94 MMHG | HEART RATE: 65 BPM | BODY MASS INDEX: 29.99 KG/M2 | RESPIRATION RATE: 18 BRPM | SYSTOLIC BLOOD PRESSURE: 138 MMHG | OXYGEN SATURATION: 99 % | HEIGHT: 65 IN

## 2024-05-02 DIAGNOSIS — M72.2 PLANTAR FASCIITIS: ICD-10-CM

## 2024-05-02 DIAGNOSIS — T14.8XXA MUSCLE STRAIN: ICD-10-CM

## 2024-05-02 DIAGNOSIS — Z76.89 ENCOUNTER TO ESTABLISH CARE WITH NEW DOCTOR: Primary | ICD-10-CM

## 2024-05-02 DIAGNOSIS — R53.83 FATIGUE, UNSPECIFIED TYPE: ICD-10-CM

## 2024-05-02 DIAGNOSIS — Z12.11 COLON CANCER SCREENING: ICD-10-CM

## 2024-05-02 DIAGNOSIS — Z78.0 POST-MENOPAUSAL: ICD-10-CM

## 2024-05-02 DIAGNOSIS — G47.33 OSA ON CPAP: ICD-10-CM

## 2024-05-02 DIAGNOSIS — Z00.00 INITIAL MEDICARE ANNUAL WELLNESS VISIT: Primary | ICD-10-CM

## 2024-05-02 LAB
DEPRECATED RDW RBC AUTO: 14.2 % (ref 12.4–15.4)
HCT VFR BLD AUTO: 40.8 % (ref 36–48)
HGB BLD-MCNC: 13.5 G/DL (ref 12–16)
MCH RBC QN AUTO: 30.7 PG (ref 26–34)
MCHC RBC AUTO-ENTMCNC: 33.1 G/DL (ref 31–36)
MCV RBC AUTO: 92.7 FL (ref 80–100)
PLATELET # BLD AUTO: 343 K/UL (ref 135–450)
PMV BLD AUTO: 8.6 FL (ref 5–10.5)
RBC # BLD AUTO: 4.4 M/UL (ref 4–5.2)
WBC # BLD AUTO: 6.5 K/UL (ref 4–11)

## 2024-05-02 ASSESSMENT — PATIENT HEALTH QUESTIONNAIRE - PHQ9
SUM OF ALL RESPONSES TO PHQ QUESTIONS 1-9: 1
SUM OF ALL RESPONSES TO PHQ9 QUESTIONS 1 & 2: 1
SUM OF ALL RESPONSES TO PHQ QUESTIONS 1-9: 1
1. LITTLE INTEREST OR PLEASURE IN DOING THINGS: NOT AT ALL
2. FEELING DOWN, DEPRESSED OR HOPELESS: SEVERAL DAYS

## 2024-05-02 ASSESSMENT — LIFESTYLE VARIABLES
HOW OFTEN DO YOU HAVE A DRINK CONTAINING ALCOHOL: MONTHLY OR LESS
HOW MANY STANDARD DRINKS CONTAINING ALCOHOL DO YOU HAVE ON A TYPICAL DAY: 1 OR 2

## 2024-05-02 ASSESSMENT — ENCOUNTER SYMPTOMS
BLOOD IN STOOL: 0
SHORTNESS OF BREATH: 0
NAUSEA: 0
ABDOMINAL PAIN: 0
STRIDOR: 0
VOMITING: 0
COUGH: 0
CHEST TIGHTNESS: 0
BACK PAIN: 0

## 2024-05-02 NOTE — PATIENT INSTRUCTIONS
keep your heart healthy and prevent heart disease. This lifestyle includes eating healthy, being active, staying at a weight that's healthy for you, and not smoking or using tobacco. It also includes taking medicines as directed, managing other health conditions, and trying to get a healthy amount of sleep.  Follow-up care is a key part of your treatment and safety. Be sure to make and go to all appointments, and call your doctor if you are having problems. It's also a good idea to know your test results and keep a list of the medicines you take.  How can you care for yourself at home?  Diet    Use less salt when you cook and eat. This helps lower your blood pressure. Taste food before salting. Add only a little salt when you think you need it. With time, your taste buds will adjust to less salt.     Eat fewer snack items, fast foods, canned soups, and other high-salt, high-fat, processed foods.     Read food labels and try to avoid saturated and trans fats. They increase your risk of heart disease by raising cholesterol levels.     Limit the amount of solid fat--butter, margarine, and shortening--you eat. Use olive, peanut, or canola oil when you cook. Bake, broil, and steam foods instead of frying them.     Eat a variety of fruit and vegetables every day. Dark green, deep orange, red, or yellow fruits and vegetables are especially good for you. Examples include spinach, carrots, peaches, and berries.     Foods high in fiber can reduce your cholesterol and provide important vitamins and minerals. High-fiber foods include whole-grain cereals and breads, oatmeal, beans, brown rice, citrus fruits, and apples.     Eat lean proteins. Heart-healthy proteins include seafood, lean meats and poultry, eggs, beans, peas, nuts, seeds, and soy products.     Limit drinks and foods with added sugar. These include candy, desserts, and soda pop.   Heart-healthy lifestyle    If your doctor recommends it, get more exercise. For many

## 2024-05-02 NOTE — PROGRESS NOTES
Mae Ordoñez (:  1955) is a 69 y.o. female,Established patient, here for evaluation of the following chief complaint(s):  Established New Doctor       Assessment & Plan   ASSESSMENT/PLAN:  1. Encounter to establish care with new doctor      2. ANA on CPAP  Stable, Refer to sleep center for repeat PSG. Recommend continued CPAP compliance.  - CBC  - [facility]    3. Fatigue, unspecified type  Check labs  - Comprehensive Metabolic Panel  - TSH  - T4, Free    4. Post-menopausal  Check Vit D, I recommend continue Ca and Vit D use with weighted exercise.  - Vitamin D 25 Hydroxy    5. Body mass index (BMI) 30.0-30.9, adult  Check labs as above  - Vitamin D 25 Hydroxy    6. Colon cancer screening  Due for CSP, refer to GI.  - Storm Sheppard MD, Gastroenterology, [place]    7. Muscle strain  Recommend HEP and stretching, consider Celestine-chi or chair yoga.    8. Plantar fasciitis  Recommend HEP and stretching    Return in about 6 weeks (around 2024) for Interval follow-up, Labs.         Subjective   SUBJECTIVE/OBJECTIVE:  HPI  Mae Ordoñez is a 69 y.o. pleasant lady presenting today with a chief complaint of establish with new PCM.    She has a past medical history significant for:  HL (LDL 30, TG 47 on 2022), not on statin   OA   ANA, on CPAP due for repeat Sleep study  Bell's palsy L sided ()   COVID-19 (2020, ?2021)  S/p tubal ligation   Former smoker (quit )   On multiple supplements through Chiropractor      # Labs Dec 2021 with ALT 66, AST 43.  .  # Dr. [facility]: colonoscopy . Was recommended 10-year surveillance. Reports she had 1 polyp in the past and repeat after 5 years was in  and it was clear.  # Pap/HPV neg 2022    She was seeing Dr. Massey.  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  # Last mammogram was done  and it was unremarkable.  # Father with brain aneurysm/?AVM. Patient was having headaches in Dec 2021 but thinks she might have had

## 2024-05-02 NOTE — PROGRESS NOTES
Patient is fasting    Left inner leg pain and behind her left knee. Itching.     Heart fluttering less frequent.     Bilat ankle swelling.     Swallowing issues.  Feels like the muscles wont let her swallow.  Not all the time.     Sleep study    How much vitamins are too much?     Urinary leakage    Patient states when she gets up at night sometimes she has issues starting to pee.     Life screening?    Mounjaro.  Weight loss        
found.    Interventions:   Patient encouraged to make appointment with their eye specialist                    Objective   Vitals:    05/02/24 1003   Resp: 18   Weight: 81.6 kg (180 lb)   Height: 1.676 m (5' 6\")      Body mass index is 29.05 kg/m².               Allergies   Allergen Reactions    Azithromycin Hives    Levaquin [Levofloxacin In D5w]     Quinolones Hives    Penicillins Hives     Prior to Visit Medications    Medication Sig Taking? Authorizing Provider   TURMERIC PO Take by mouth  Ronak Wells MD   NONFORMULARY immuplex  ProviderRonak MD   EPINEPHrine (EPIPEN 2-ROSMERY) 0.3 MG/0.3ML SOAJ injection Inject 0.3 mLs into the muscle once for 1 dose Use as directed for severe allergic reaction  Girish Cruz MD   b complex vitamins capsule Take 1 capsule by mouth daily as needed  Patient not taking: Reported on 5/2/2024  Ronak Wells MD   magnesium oxide (MAG-OX) 400 MG tablet Take 1 tablet by mouth daily as needed  Ronak Wells MD   CALCIUM LACTATE PO Take by mouth as needed  Ronak Wells MD   Cholecalciferol (VITAMIN D3) 125 MCG (5000 UT) TABS Take by mouth  Ronak Wells MD   aspirin 81 MG chewable tablet Take 1 tablet by mouth daily  Ronak Wells MD   UNABLE TO FIND Take 1 tablet by mouth 2 times daily Indications: NEUROPLEX  Ronak Wells MD   COD LIVER OIL PO Take by mouth OTC  Ronak Wells MD   Garlic 1000 MG CAPS Take 1 tablet by mouth daily as needed  Ronak Wells MD   diphenhydrAMINE (BENADRYL) 25 MG capsule Take 1 capsule by mouth daily as needed for Itching  Ronak Wells MD   ibuprofen (ADVIL;MOTRIN) 200 MG tablet Take 1 tablet by mouth daily as needed for Pain  ProviderRonak MD       CareTeam (Including outside providers/suppliers regularly involved in providing care):   Patient Care Team:  Trey Marshall MD as PCP - General (Internal Medicine)  Godfrey Linares PA as PCP - [facility]

## 2024-05-03 LAB
25(OH)D3 SERPL-MCNC: 99.1 NG/ML
ALBUMIN SERPL-MCNC: 4.8 G/DL (ref 3.4–5)
ALBUMIN/GLOB SERPL: 1.8 {RATIO} (ref 1.1–2.2)
ALP SERPL-CCNC: 58 U/L (ref 40–129)
ALT SERPL-CCNC: 38 U/L (ref 10–40)
ANION GAP SERPL CALCULATED.3IONS-SCNC: 12 MMOL/L (ref 3–16)
AST SERPL-CCNC: 30 U/L (ref 15–37)
BILIRUB SERPL-MCNC: 0.3 MG/DL (ref 0–1)
BUN SERPL-MCNC: 12 MG/DL (ref 7–20)
CALCIUM SERPL-MCNC: 9.8 MG/DL (ref 8.3–10.6)
CHLORIDE SERPL-SCNC: 105 MMOL/L (ref 99–110)
CO2 SERPL-SCNC: 26 MMOL/L (ref 21–32)
CREAT SERPL-MCNC: 0.7 MG/DL (ref 0.6–1.2)
GFR SERPLBLD CREATININE-BSD FMLA CKD-EPI: >90 ML/MIN/{1.73_M2}
GLUCOSE SERPL-MCNC: 105 MG/DL (ref 70–99)
POTASSIUM SERPL-SCNC: 4.6 MMOL/L (ref 3.5–5.1)
PROT SERPL-MCNC: 7.4 G/DL (ref 6.4–8.2)
SODIUM SERPL-SCNC: 143 MMOL/L (ref 136–145)
T4 FREE SERPL-MCNC: 1.1 NG/DL (ref 0.9–1.8)
TSH SERPL DL<=0.005 MIU/L-ACNC: 2.27 UIU/ML (ref 0.27–4.2)

## 2024-05-08 ENCOUNTER — TELEPHONE (OUTPATIENT)
Dept: GASTROENTEROLOGY | Age: 69
End: 2024-05-08

## 2024-05-08 NOTE — TELEPHONE ENCOUNTER
Called pt. In regards to a referral for a colon screening. Made appt for pt to see lv on 5/30/24 @11:30am

## 2024-05-21 ENCOUNTER — HOSPITAL ENCOUNTER (OUTPATIENT)
Dept: CT IMAGING | Age: 69
Discharge: HOME | End: 2024-05-21
Payer: MEDICARE

## 2024-05-21 PROCEDURE — 75571 CT HRT W/O DYE W/CA TEST: CPT

## 2024-05-22 ENCOUNTER — HOSPITAL ENCOUNTER (OUTPATIENT)
Dept: SLEEP CENTER | Age: 69
Discharge: HOME | End: 2024-05-22
Payer: MEDICARE

## 2024-05-22 VITALS
BODY MASS INDEX: 30.73 KG/M2 | HEART RATE: 82 BPM | OXYGEN SATURATION: 95 % | WEIGHT: 180 LBS | DIASTOLIC BLOOD PRESSURE: 75 MMHG | SYSTOLIC BLOOD PRESSURE: 149 MMHG | HEIGHT: 64 IN

## 2024-05-22 DIAGNOSIS — G47.33 OSA (OBSTRUCTIVE SLEEP APNEA): Primary | ICD-10-CM

## 2024-05-22 DIAGNOSIS — G47.10 HYPERSOMNIA: ICD-10-CM

## 2024-05-22 DIAGNOSIS — E66.9 OBESITY (BMI 30-39.9): ICD-10-CM

## 2024-05-22 DIAGNOSIS — Z87.891 EX-CIGARETTE SMOKER: ICD-10-CM

## 2024-05-22 PROCEDURE — 75571 CT HRT W/O DYE W/CA TEST: CPT | Performed by: INTERNAL MEDICINE

## 2024-05-22 PROCEDURE — 99211 OFF/OP EST MAY X REQ PHY/QHP: CPT

## 2024-05-22 PROCEDURE — 99204 OFFICE O/P NEW MOD 45 MIN: CPT | Performed by: INTERNAL MEDICINE

## 2024-05-22 ASSESSMENT — SLEEP AND FATIGUE QUESTIONNAIRES
HOW LIKELY ARE YOU TO NOD OFF OR FALL ASLEEP WHILE SITTING AND READING: HIGH CHANCE OF DOZING
HOW LIKELY ARE YOU TO NOD OFF OR FALL ASLEEP IN A CAR, WHILE STOPPED FOR A FEW MINUTES IN TRAFFIC: WOULD NEVER DOZE
NECK CIRCUMFERENCE (INCHES): 15.5
HOW LIKELY ARE YOU TO NOD OFF OR FALL ASLEEP WHILE SITTING INACTIVE IN A PUBLIC PLACE: WOULD NEVER DOZE
HOW LIKELY ARE YOU TO NOD OFF OR FALL ASLEEP WHEN YOU ARE A PASSENGER IN A CAR FOR AN HOUR WITHOUT A BREAK: WOULD NEVER DOZE
HOW LIKELY ARE YOU TO NOD OFF OR FALL ASLEEP WHILE LYING DOWN TO REST IN THE AFTERNOON WHEN CIRCUMSTANCES PERMIT: WOULD NEVER DOZE
HOW LIKELY ARE YOU TO NOD OFF OR FALL ASLEEP WHILE WATCHING TV: SLIGHT CHANCE OF DOZING
ESS TOTAL SCORE: 4
HOW LIKELY ARE YOU TO NOD OFF OR FALL ASLEEP WHILE SITTING QUIETLY AFTER LUNCH WITHOUT ALCOHOL: WOULD NEVER DOZE
HOW LIKELY ARE YOU TO NOD OFF OR FALL ASLEEP WHILE SITTING AND TALKING TO SOMEONE: WOULD NEVER DOZE

## 2024-05-22 NOTE — CONSULTS
[facility]      Judah Glover MD, FACP, [facility]  Tim Gurrola MD, [facility]  Orion Draper MD, [facility]  Mariah Renae DO      [address]  [address]  [place] [address]   PH: [phone]  F: [phone]     Subjective:     Patient ID: Mae Ordoñez is a 69 y.o. female, referred to the sleep center for   Chief Complaint   Patient presents with    G47.33   .    Referring physician:  Trey Marshall [facility] Provider (PCP)      History:has been referred for the ANA. She had ANA diagnosed about 12 years ago.She is on a CPAP     Symptoms:   [x]  Snoring                                                                [x]  Dry Mouth  []  Choking                                                               []  Morning Headaches  []  Gasping for Air                                                   []  Trouble Falling asleep  []  Tired during the daytime                                    []  Trouble Staying Asleep  []  Tired when you wake up                                    [x]  Weight Gain in Last 5 Years  []  Wake up frequently at night                                []  Weight Loss in Last 5 Years  []  Shortness Of Breath                                            []  Shift Worker  []  Coughing                                                             [x]  Smoker (Previous or Current) 1 pk/day x 8 yrs quit in 1974  []  Chest Pain                                                         []  Anxiety  []  Trouble keeping your legs still at night                 []  Depression  []  Kicking your legs in your sleep                            []  Insomnia     []  Palpitation  [x]  Stop breathing      []  Other:     Significant Co-morbidities:  []  Congestive Heart Failure     []  COPD         []  Stroke (Past 30 Days)      []  Supplemental Oxygen Usage       []  Cognitive Impairment      []  Neuromuscular Problems  []  Epilepsy/Neurological

## 2024-05-23 DIAGNOSIS — I25.10 CORONARY ARTERY DISEASE INVOLVING NATIVE CORONARY ARTERY OF NATIVE HEART WITHOUT ANGINA PECTORIS: Primary | ICD-10-CM

## 2024-05-23 RX ORDER — ROSUVASTATIN CALCIUM 20 MG/1
20 TABLET, COATED ORAL NIGHTLY
Qty: 90 TABLET | Refills: 1 | Status: SHIPPED | OUTPATIENT
Start: 2024-05-23

## 2024-05-30 ENCOUNTER — OFFICE VISIT (OUTPATIENT)
Dept: GASTROENTEROLOGY | Age: 69
End: 2024-05-30

## 2024-05-30 ENCOUNTER — PREP FOR PROCEDURE (OUTPATIENT)
Dept: GASTROENTEROLOGY | Age: 69
End: 2024-05-30

## 2024-05-30 VITALS
WEIGHT: 184 LBS | HEIGHT: 64 IN | OXYGEN SATURATION: 98 % | SYSTOLIC BLOOD PRESSURE: 120 MMHG | HEART RATE: 70 BPM | DIASTOLIC BLOOD PRESSURE: 80 MMHG | BODY MASS INDEX: 31.41 KG/M2

## 2024-05-30 DIAGNOSIS — Z12.11 ENCOUNTER FOR SCREENING COLONOSCOPY: ICD-10-CM

## 2024-05-30 DIAGNOSIS — Z12.11 ENCOUNTER FOR SCREENING COLONOSCOPY: Primary | ICD-10-CM

## 2024-05-30 PROCEDURE — NBSRV NON-BILLABLE SERVICE: Performed by: NURSE PRACTITIONER

## 2024-05-30 RX ORDER — SIMETHICONE 80 MG
80 TABLET,CHEWABLE ORAL ONCE
Qty: 3 TABLET | Refills: 0 | Status: SHIPPED | OUTPATIENT
Start: 2024-05-30 | End: 2024-05-30

## 2024-05-30 RX ORDER — POLYETHYLENE GLYCOL 3350, SODIUM SULFATE, SODIUM CHLORIDE, POTASSIUM CHLORIDE, ASCORBIC ACID, SODIUM ASCORBATE 140-9-5.2G
1 KIT ORAL ONCE
Qty: 1 EACH | Refills: 0 | Status: SHIPPED | OUTPATIENT
Start: 2024-05-30 | End: 2024-05-30

## 2024-05-30 RX ORDER — SODIUM CHLORIDE 0.9 % (FLUSH) 0.9 %
5-40 SYRINGE (ML) INJECTION PRN
OUTPATIENT
Start: 2024-05-30

## 2024-05-30 RX ORDER — SODIUM CHLORIDE, SODIUM LACTATE, POTASSIUM CHLORIDE, CALCIUM CHLORIDE 600; 310; 30; 20 MG/100ML; MG/100ML; MG/100ML; MG/100ML
INJECTION, SOLUTION INTRAVENOUS CONTINUOUS
OUTPATIENT
Start: 2024-05-30

## 2024-05-30 RX ORDER — SODIUM CHLORIDE 9 MG/ML
INJECTION, SOLUTION INTRAVENOUS PRN
OUTPATIENT
Start: 2024-05-30

## 2024-05-30 RX ORDER — SODIUM CHLORIDE 0.9 % (FLUSH) 0.9 %
5-40 SYRINGE (ML) INJECTION EVERY 12 HOURS SCHEDULED
OUTPATIENT
Start: 2024-05-30

## 2024-05-30 ASSESSMENT — ENCOUNTER SYMPTOMS
ABDOMINAL PAIN: 0
BACK PAIN: 0
VOMITING: 0
CONSTIPATION: 0
SHORTNESS OF BREATH: 0
NAUSEA: 0
COLOR CHANGE: 0
PHOTOPHOBIA: 0
BLOOD IN STOOL: 0
COUGH: 0
EYE PAIN: 0
DIARRHEA: 0

## 2024-05-30 NOTE — PROGRESS NOTES
Mae Ordoñez 69 y.o. female was seen by JAIME Mendoza on 05/30/24     Wt Readings from Last 3 Encounters:   05/30/24 83.5 kg (184 lb)   05/22/24 81.6 kg (180 lb)   05/02/24 81.6 kg (180 lb)       HPI  Mae Ordoñez is a pleasant 69 y.o.  female who presents today to schedule a colonoscopy.  She has a past medical history of H/O Bell's palsy, h/o exercise stress test, menopause, obesity, rosacea, sleep apnea, and urinary incontinence.  Her last colonoscopy was done by Dr. Garay in 2014 showing normal colon.   She denies changes in her bowel pattern.  Her typical bowel pattern is every other day with soft brown formed stools to soft blobs of stool.  No diarrhea or constipation.  No blood in her stools or melena.  No excess belching or flatulence.  Her appetite is good without early satiety. Her weight is stable.  No nausea or vomiting.  No abdominal pain, bloating or distention.  No heartburn or acid reflux.  No nocturnal awakenings with acid reflux.  No dysphagia or pain with swallowing.  Her maternal grandfather had colon cancer at uncertain age.      ROS  Review of Systems   Constitutional:  Negative for chills, diaphoresis and fever.   HENT:  Negative for ear pain, hearing loss and tinnitus.    Eyes:  Negative for photophobia and pain.   Respiratory:  Negative for cough and shortness of breath.    Cardiovascular:  Negative for chest pain, palpitations and leg swelling.   Gastrointestinal:  Negative for abdominal pain, blood in stool, constipation, diarrhea, nausea and vomiting.   Endocrine: Negative for cold intolerance, heat intolerance and polydipsia.   Genitourinary:  Negative for dysuria, frequency and urgency.   Musculoskeletal:  Negative for back pain, myalgias and neck pain.   Skin:  Negative for color change, pallor and rash.   Allergic/Immunologic: Positive for environmental allergies (Seasonal). Negative for food allergies.   Neurological:  Negative for dizziness,

## 2024-06-29 PROBLEM — Z12.11 ENCOUNTER FOR SCREENING COLONOSCOPY: Status: RESOLVED | Noted: 2024-05-30 | Resolved: 2024-06-29

## 2024-07-01 ENCOUNTER — HOSPITAL ENCOUNTER (OUTPATIENT)
Dept: WOMENS IMAGING | Age: 69
Discharge: HOME | End: 2024-07-01
Payer: MEDICARE

## 2024-07-01 DIAGNOSIS — Z12.31 SCREENING MAMMOGRAM FOR BREAST CANCER: ICD-10-CM

## 2024-07-01 PROCEDURE — 77063 BREAST TOMOSYNTHESIS BI: CPT

## 2024-07-02 ENCOUNTER — OFFICE VISIT (OUTPATIENT)
Age: 69
End: 2024-07-02
Payer: MEDICARE

## 2024-07-02 VITALS
HEART RATE: 71 BPM | HEIGHT: 64 IN | DIASTOLIC BLOOD PRESSURE: 78 MMHG | WEIGHT: 165.8 LBS | BODY MASS INDEX: 28.31 KG/M2 | OXYGEN SATURATION: 97 % | SYSTOLIC BLOOD PRESSURE: 130 MMHG

## 2024-07-02 DIAGNOSIS — L20.9 ATOPIC DERMATITIS, UNSPECIFIED TYPE: ICD-10-CM

## 2024-07-02 DIAGNOSIS — I25.10 CORONARY ARTERY DISEASE INVOLVING NATIVE CORONARY ARTERY OF NATIVE HEART WITHOUT ANGINA PECTORIS: Primary | ICD-10-CM

## 2024-07-02 DIAGNOSIS — Z78.0 POST-MENOPAUSAL: ICD-10-CM

## 2024-07-02 DIAGNOSIS — R01.1 HEART MURMUR: ICD-10-CM

## 2024-07-02 DIAGNOSIS — L91.8 ACHROCHORDON: ICD-10-CM

## 2024-07-02 PROCEDURE — 93005 ELECTROCARDIOGRAM TRACING: CPT | Performed by: GENERAL PRACTICE

## 2024-07-02 RX ORDER — ROSUVASTATIN CALCIUM 40 MG/1
40 TABLET, COATED ORAL NIGHTLY
Qty: 90 TABLET | Refills: 3 | Status: SHIPPED | OUTPATIENT
Start: 2024-07-02

## 2024-07-02 RX ORDER — TRIAMCINOLONE ACETONIDE 0.25 MG/G
OINTMENT TOPICAL
Qty: 80 G | Refills: 1 | Status: SHIPPED | OUTPATIENT
Start: 2024-07-02 | End: 2024-07-09

## 2024-07-02 RX ORDER — ROSUVASTATIN CALCIUM 20 MG/1
20 TABLET, COATED ORAL NIGHTLY
Qty: 90 TABLET | Refills: 3 | Status: SHIPPED | OUTPATIENT
Start: 2024-07-02 | End: 2024-07-02

## 2024-07-02 RX ORDER — ASPIRIN 81 MG/1
81 TABLET ORAL DAILY
Qty: 90 TABLET | Refills: 3 | Status: SHIPPED | OUTPATIENT
Start: 2024-07-02

## 2024-07-02 ASSESSMENT — ENCOUNTER SYMPTOMS
STRIDOR: 0
NAUSEA: 0
ABDOMINAL PAIN: 0
BACK PAIN: 0
BLOOD IN STOOL: 0
CHEST TIGHTNESS: 0
COUGH: 0
VOMITING: 0
SHORTNESS OF BREATH: 0

## 2024-07-02 NOTE — PROGRESS NOTES
Mae Ordoñez (:  1955) is a 69 y.o. female,Established patient, here for evaluation of the following chief complaint(s):  Follow-up and Discuss Labs       Assessment & Plan   ASSESSMENT/PLAN:  1. Coronary artery disease involving native coronary artery of native heart without angina pectoris  New CAD, not taking statin. Recommend start crestor 40mg, check lipid panel, cont asa (discussed risks/benefits of bleeding on medication as not a diabetic). Refer to stress test. Check LFT for statin induced transaminitis.  - LIPID PANEL; Future  - aspirin 81 MG EC tablet; Take 1 tablet by mouth daily  Dispense: 90 tablet; Refill: 3  - Exercise stress test; Future  - rosuvastatin (CRESTOR) 40 MG tablet; Take 1 tablet by mouth nightly  Dispense: 90 tablet; Refill: 3  - EKG 12 Lead - Clinic Performed; Future  - Hepatic Function Panel; Future    2. Post-menopausal  ordered  - DEXA BONE DENSITY AXIAL SKELETON; Future    3. Heart murmur  Check US for new murmu, not previousl appreciated.  - Echo (TTE) limited (PRN contrast/bubble/strain/3D); Future    4. Atopic dermatitis, unspecified type  Start topical kenalog PRN  - triamcinolone (KENALOG) 0.025 % ointment; Apply topically 2 times daily.  Dispense: 80 g; Refill: 1    5. Achrochordon  Tolerated cryo of 2 skin tags.     Use of liquid nitrogen brent-AC gun with fine tip. Freezing with 0.25cm margin. Repeat x1. Tolerated freezing. Discussed skin care and to f/u in 2 wks for repeat if needed.          Return in about 3 months (around 10/2/2024) for Interval follow-up, Labs 1 week prior to appointment.         Subjective   SUBJECTIVE/OBJECTIVE:  HPI  Mae Ordoñez is a 69 y.o. pleasant lady presenting today with a chief complaint of HL, ANA.    She has a past medical history significant for:  HL (LDL 30, TG 47 on 2022), not on statin   CAD on Cardiac CT, CAC score 607.   OA   ANA, on CPAP due for repeat Sleep study  Bell's palsy L sided (2018)   COVID-19

## 2024-07-02 NOTE — PROGRESS NOTES
Pt declines vaccines    Tetanus vaccination status reviewed: Td vaccination indicated and given today.     Tetanus vaccine was give in left deltoid pt did well

## 2024-07-08 ENCOUNTER — HOSPITAL ENCOUNTER (OUTPATIENT)
Dept: SLEEP CENTER | Age: 69
Discharge: HOME | End: 2024-07-08
Payer: MEDICARE

## 2024-07-08 ENCOUNTER — OFFICE VISIT (OUTPATIENT)
Age: 69
End: 2024-07-08
Payer: MEDICARE

## 2024-07-08 VITALS — DIASTOLIC BLOOD PRESSURE: 62 MMHG | HEART RATE: 72 BPM | SYSTOLIC BLOOD PRESSURE: 128 MMHG | OXYGEN SATURATION: 98 %

## 2024-07-08 DIAGNOSIS — R39.9 SYMPTOMS OF URINARY TRACT INFECTION: Primary | ICD-10-CM

## 2024-07-08 DIAGNOSIS — G47.33 OSA (OBSTRUCTIVE SLEEP APNEA): ICD-10-CM

## 2024-07-08 PROCEDURE — 81002 URINALYSIS NONAUTO W/O SCOPE: CPT | Performed by: PHYSICIAN ASSISTANT

## 2024-07-08 PROCEDURE — 95811 POLYSOM 6/>YRS CPAP 4/> PARM: CPT

## 2024-07-08 RX ORDER — NITROFURANTOIN 25; 75 MG/1; MG/1
100 CAPSULE ORAL 2 TIMES DAILY
Qty: 14 CAPSULE | Refills: 0 | Status: SHIPPED | OUTPATIENT
Start: 2024-07-08 | End: 2024-07-15

## 2024-07-08 RX ORDER — FLUCONAZOLE 150 MG/1
150 TABLET ORAL ONCE
Qty: 1 TABLET | Refills: 0 | Status: SHIPPED | OUTPATIENT
Start: 2024-07-08 | End: 2024-07-08

## 2024-07-08 ASSESSMENT — ENCOUNTER SYMPTOMS
DIARRHEA: 0
NAUSEA: 0
CONSTIPATION: 0
SHORTNESS OF BREATH: 0
EYE PAIN: 0
BLOOD IN STOOL: 0
CHEST TIGHTNESS: 0
BACK PAIN: 0
PHOTOPHOBIA: 0
WHEEZING: 0
COUGH: 0
COLOR CHANGE: 0
VOMITING: 0
ABDOMINAL PAIN: 0
EYE DISCHARGE: 0
EYE REDNESS: 0
RHINORRHEA: 0
SORE THROAT: 0

## 2024-07-08 NOTE — PROGRESS NOTES
current facility-administered medications for this visit.       /62   Pulse 72   SpO2 98%     Review of Systems   Constitutional:  Negative for appetite change, chills, fatigue and fever.   HENT:  Negative for congestion, ear pain, hearing loss, rhinorrhea and sore throat.    Eyes:  Negative for photophobia, pain, discharge and redness.   Respiratory:  Negative for cough, chest tightness, shortness of breath and wheezing.    Cardiovascular:  Negative for chest pain, palpitations and leg swelling.   Gastrointestinal:  Negative for abdominal pain, blood in stool, constipation, diarrhea, nausea and vomiting.   Endocrine: Negative for polyuria.   Genitourinary:  Positive for difficulty urinating, frequency and urgency. Negative for dysuria, flank pain and hematuria.   Musculoskeletal:  Negative for arthralgias, back pain, gait problem and joint swelling.   Skin:  Negative for color change and rash.   Neurological:  Negative for dizziness, syncope, weakness, light-headedness and headaches.   Hematological:  Negative for adenopathy.   Psychiatric/Behavioral:  Negative for agitation, behavioral problems and suicidal ideas. The patient is not nervous/anxious.        Physical Exam  Constitutional:       Appearance: She is not ill-appearing or toxic-appearing.   HENT:      Head: Normocephalic and atraumatic.      Right Ear: External ear normal.      Left Ear: External ear normal.   Cardiovascular:      Rate and Rhythm: Regular rhythm.      Pulses: Normal pulses.      Heart sounds: Murmur heard.   Pulmonary:      Effort: Pulmonary effort is normal. No respiratory distress.      Breath sounds: Normal breath sounds.   Abdominal:      General: There is no distension.      Palpations: There is no mass.      Tenderness: There is abdominal tenderness. There is no right CVA tenderness, left CVA tenderness, guarding or rebound.      Hernia: No hernia is present.      Comments: Mild suprapubic tenderness without rebound or

## 2024-07-09 LAB
BILIRUBIN, POC: NEGATIVE
BLOOD URINE, POC: ABNORMAL
CLARITY, POC: CLEAR
COLOR, POC: YELLOW
GLUCOSE URINE, POC: NEGATIVE
KETONES, POC: NEGATIVE
LEUKOCYTE EST, POC: ABNORMAL
NITRITE, POC: NEGATIVE
PH, POC: 6
PROTEIN, POC: 30
SPECIFIC GRAVITY, POC: 1.02
UROBILINOGEN, POC: 0.2

## 2024-07-10 LAB
BACTERIA UR CULT: ABNORMAL
ORGANISM: ABNORMAL

## 2024-07-10 NOTE — RESULT ENCOUNTER NOTE
Gaspert:    Hello Mrs. Ordoñez;    Your urine culture did come back positive for UTI, it shows sensitivity to all antibiotics so the medication that was sent in should work to treat infection.  Be sure to complete course of antibiotic as prescribed.  Return to clinic if your symptoms persist, worsen or change.    Please let me know if you have any other questions or concerns.  ThanksGodfrey

## 2024-07-11 PROCEDURE — 95811 POLYSOM 6/>YRS CPAP 4/> PARM: CPT | Performed by: INTERNAL MEDICINE

## 2024-07-24 ENCOUNTER — HOSPITAL ENCOUNTER (OUTPATIENT)
Dept: WOMENS IMAGING | Age: 69
Discharge: HOME | End: 2024-07-24
Attending: GENERAL PRACTICE
Payer: MEDICARE

## 2024-07-24 DIAGNOSIS — Z78.0 POST-MENOPAUSAL: ICD-10-CM

## 2024-07-24 PROCEDURE — 77080 DXA BONE DENSITY AXIAL: CPT

## 2024-07-30 ENCOUNTER — HOSPITAL ENCOUNTER (OUTPATIENT)
Dept: NON INVASIVE DIAGNOSTICS | Age: 69
Discharge: HOME | End: 2024-08-01
Attending: GENERAL PRACTICE
Payer: MEDICARE

## 2024-07-30 VITALS
HEART RATE: 72 BPM | HEIGHT: 64 IN | BODY MASS INDEX: 28.17 KG/M2 | SYSTOLIC BLOOD PRESSURE: 128 MMHG | DIASTOLIC BLOOD PRESSURE: 62 MMHG | WEIGHT: 165 LBS

## 2024-07-30 DIAGNOSIS — I25.10 CORONARY ARTERY DISEASE INVOLVING NATIVE CORONARY ARTERY OF NATIVE HEART WITHOUT ANGINA PECTORIS: ICD-10-CM

## 2024-07-30 DIAGNOSIS — R01.1 HEART MURMUR: ICD-10-CM

## 2024-07-30 LAB
ECHO AO ROOT DIAM: 3.1 CM
ECHO AO ROOT INDEX: 1.72 CM/M2
ECHO AV AREA PEAK VELOCITY: 1.7 CM2
ECHO AV AREA VTI: 1.6 CM2
ECHO AV AREA/BSA PEAK VELOCITY: 0.9 CM2/M2
ECHO AV AREA/BSA VTI: 0.9 CM2/M2
ECHO AV MEAN GRADIENT: 11 MMHG
ECHO AV MEAN VELOCITY: 1.5 M/S
ECHO AV PEAK GRADIENT: 20 MMHG
ECHO AV PEAK VELOCITY: 2.3 M/S
ECHO AV VELOCITY RATIO: 0.52
ECHO AV VTI: 48 CM
ECHO BSA: 1.84 M2
ECHO BSA: 1.84 M2
ECHO EST RA PRESSURE: 3 MMHG
ECHO IVC PROX: 2 CM
ECHO LA AREA 4C: 13.1 CM2
ECHO LA DIAMETER INDEX: 1.72 CM/M2
ECHO LA DIAMETER: 3.1 CM
ECHO LA MAJOR AXIS: 5.6 CM
ECHO LA TO AORTIC ROOT RATIO: 1
ECHO LA VOL MOD A4C: 26 ML (ref 22–52)
ECHO LA VOLUME INDEX MOD A4C: 14 ML/M2 (ref 16–34)
ECHO LV E' LATERAL VELOCITY: 12 CM/S
ECHO LV E' SEPTAL VELOCITY: 9 CM/S
ECHO LV EDV A4C: 70 ML
ECHO LV EDV INDEX A4C: 39 ML/M2
ECHO LV EJECTION FRACTION A4C: 60 %
ECHO LV ESV A4C: 28 ML
ECHO LV ESV INDEX A4C: 16 ML/M2
ECHO LV FRACTIONAL SHORTENING: 42 % (ref 28–44)
ECHO LV INTERNAL DIMENSION DIASTOLE INDEX: 2.39 CM/M2
ECHO LV INTERNAL DIMENSION DIASTOLIC: 4.3 CM (ref 3.9–5.3)
ECHO LV INTERNAL DIMENSION SYSTOLIC INDEX: 1.39 CM/M2
ECHO LV INTERNAL DIMENSION SYSTOLIC: 2.5 CM
ECHO LV IVSD: 1.2 CM (ref 0.6–0.9)
ECHO LV MASS 2D: 162.9 G (ref 67–162)
ECHO LV MASS INDEX 2D: 90.5 G/M2 (ref 43–95)
ECHO LV POSTERIOR WALL DIASTOLIC: 1 CM (ref 0.6–0.9)
ECHO LV RELATIVE WALL THICKNESS RATIO: 0.47
ECHO LVOT AREA: 3.1 CM2
ECHO LVOT AV VTI INDEX: 0.5
ECHO LVOT DIAM: 2 CM
ECHO LVOT MEAN GRADIENT: 3 MMHG
ECHO LVOT PEAK GRADIENT: 6 MMHG
ECHO LVOT PEAK VELOCITY: 1.2 M/S
ECHO LVOT STROKE VOLUME INDEX: 41.5 ML/M2
ECHO LVOT SV: 74.7 ML
ECHO LVOT VTI: 23.8 CM
ECHO MV A VELOCITY: 0.98 M/S
ECHO MV E DECELERATION TIME (DT): 232 MS
ECHO MV E VELOCITY: 0.6 M/S
ECHO MV E/A RATIO: 0.61
ECHO MV E/E' LATERAL: 5
ECHO MV E/E' RATIO (AVERAGED): 5.83
ECHO MV E/E' SEPTAL: 6.67
ECHO RIGHT VENTRICULAR SYSTOLIC PRESSURE (RVSP): 24 MMHG
ECHO TV REGURGITANT MAX VELOCITY: 2.28 M/S
ECHO TV REGURGITANT PEAK GRADIENT: 21 MMHG
STRESS BASELINE DIAS BP: 71 MMHG
STRESS BASELINE HR: 75 BPM
STRESS BASELINE ST DEPRESSION: 0 MM
STRESS BASELINE SYS BP: 141 MMHG
STRESS ESTIMATED WORKLOAD: 5.9 METS
STRESS PEAK DIAS BP: 82 MMHG
STRESS PEAK SYS BP: 185 MMHG
STRESS PERCENT HR ACHIEVED: 94 %
STRESS POST PEAK HR: 144 BPM
STRESS RATE PRESSURE PRODUCT: NORMAL BPM*MMHG
STRESS ST DEPRESSION: 0 MM
STRESS TARGET HR: 153 BPM

## 2024-07-30 PROCEDURE — 93325 DOPPLER ECHO COLOR FLOW MAPG: CPT | Performed by: INTERNAL MEDICINE

## 2024-07-30 PROCEDURE — 93017 CV STRESS TEST TRACING ONLY: CPT

## 2024-07-30 PROCEDURE — 93321 DOPPLER ECHO F-UP/LMTD STD: CPT | Performed by: INTERNAL MEDICINE

## 2024-07-30 PROCEDURE — 93308 TTE F-UP OR LMTD: CPT | Performed by: INTERNAL MEDICINE

## 2024-07-30 PROCEDURE — 93016 CV STRESS TEST SUPVJ ONLY: CPT | Performed by: INTERNAL MEDICINE

## 2024-07-30 PROCEDURE — 93018 CV STRESS TEST I&R ONLY: CPT | Performed by: INTERNAL MEDICINE

## 2024-07-30 PROCEDURE — 93325 DOPPLER ECHO COLOR FLOW MAPG: CPT

## 2024-07-31 PROBLEM — Z12.11 ENCOUNTER FOR SCREENING COLONOSCOPY: Status: ACTIVE | Noted: 2024-05-30

## 2024-08-01 ENCOUNTER — INITIAL CONSULT (OUTPATIENT)
Age: 69
End: 2024-08-01
Payer: MEDICARE

## 2024-08-01 VITALS
HEIGHT: 64 IN | BODY MASS INDEX: 28.34 KG/M2 | SYSTOLIC BLOOD PRESSURE: 138 MMHG | WEIGHT: 166 LBS | DIASTOLIC BLOOD PRESSURE: 82 MMHG

## 2024-08-01 DIAGNOSIS — Z01.419 WELL WOMAN EXAM WITH ROUTINE GYNECOLOGICAL EXAM: Primary | ICD-10-CM

## 2024-08-01 DIAGNOSIS — Z78.0 OSTEOPENIA AFTER MENOPAUSE: ICD-10-CM

## 2024-08-01 DIAGNOSIS — M85.80 OSTEOPENIA AFTER MENOPAUSE: ICD-10-CM

## 2024-08-01 DIAGNOSIS — N95.2 VAGINAL ATROPHY: ICD-10-CM

## 2024-08-01 DIAGNOSIS — N94.10 DYSPAREUNIA IN FEMALE: ICD-10-CM

## 2024-08-01 DIAGNOSIS — N89.8 VAGINAL DRYNESS: ICD-10-CM

## 2024-08-01 DIAGNOSIS — Z78.0 POSTMENOPAUSAL: ICD-10-CM

## 2024-08-01 PROCEDURE — G0101 CA SCREEN;PELVIC/BREAST EXAM: HCPCS | Performed by: STUDENT IN AN ORGANIZED HEALTH CARE EDUCATION/TRAINING PROGRAM

## 2024-08-01 NOTE — PROGRESS NOTES
Department of Obstetrics and Gynecology  Well Woman Office Visit  Name:  Mae Ordoñez   CSN: [id number]    : 1955   Age: 69 y.o.       Chief Complaint   Patient presents with    Consultation     Patient presents today as a referral from PCP for well woman exam     New Patient     Annual exam, postmenopausal, no HRT, not sexually active, pap  negative, Date of last Mammogram: 2024, dexa osteopenia on OTC Ca and Vit D, colonoscopy  negative next one scheduled this month. Reports vaginal dryness which causes dyspareunia but pt does not want any medication for issue.         SUBJECTIVE:  Mae Ordoñez is a 69 y.o.  who presents for a routine well woman exam. Reports restricted urine flow that has been going on for about a year, on and off. First thing in the morning, goes away, not through the day. No dysuria, no blood in urine. No issues with constipation or diarrhea. Reports vaginal dryness that causes dyspareunia.     GYN HX:    Menopause at age 57.  Patient denies post-menopausal vaginal bleeding.   The patient is not taking hormone replacement therapy.   Cervical cancer screening:  Last pap smear , negative.  No history of abnormal pap smears.  Breast cancer screening:   Last mammogram 24 normal.  Osteoporosis screening:   Last DEXA scan: Osteopenia, last week  Sexual history:  Currently is sexually active.  Denies issues of sexual dysfunction.  Denies current or past issues with physical, emotional, or sexual abuse.  No family history of gyn malignancy.     Patient's medications, allergies, past medical, surgical, social and family histories were reviewed and updated as appropriate.    Past Medical History:   Diagnosis Date    H/O Bell's palsy     H/O exercise stress test 10/03/2014    Negative    Menopause     Rosacea     Sleep apnea     uses C-Pap    Urinary incontinence     Vaginal dryness        Past Surgical History:   Procedure Laterality Date    COLPOSCOPY

## 2024-08-13 NOTE — PROGRESS NOTES
Surgery @ [facility] on 08/20/24 you will be called 08/19/24 with times    NOTHING TO EAT OR DRINK AFTER MIDNIGHT DAY OF SURGERY    1. Enter thru the hospital main entrance on day of surgery, check in at the Information Desk. If you arrive prior to 6:00am, enter thru the ER entrance.    2. Follow the directions as prescribed by the doctor for your procedure and medications.                 Stop vitamins, supplements and NSAIDS:      3. Check with your Doctor regarding stopping blood thinners and follow their instructions.    4. Do not smoke, vape or use chewing tobacco morning of surgery. Do not drink any alcoholic beverages 24 hours prior to surgery.       This includes NA Beer. No street drugs 7 days prior to surgery.    5. If you have dentures, contacts of glasses they will be removed before going to the OR; please bring a case.    6. Please bring picture ID, insurance card, paperwork from the doctor’s office (H & P, Consent, & card for implantable devices).    7. Take a shower with an antibacterial soap the night before surgery and the morning of surgery. Do not put anything on your skin      After your morning shower.    8. You will need a responsible adult to drive you home and check on you after surgery.

## 2024-08-17 ENCOUNTER — ANESTHESIA EVENT (OUTPATIENT)
Dept: ENDOSCOPY | Age: 69
End: 2024-08-17
Payer: MEDICARE

## 2024-08-17 NOTE — ANESTHESIA PRE PROCEDURE
Department of Anesthesiology  Preprocedure Note       Name:  Mae Ordoñez   Age:  69 y.o.  :  1955                                          MRN:  [id number]         Date:  2024      Surgeon: Surgeon(s):  Storm Camacho MD    Procedure: Procedure(s):  COLONOSCOPY DIAGNOSTIC    Medications prior to admission:   Prior to Admission medications    Medication Sig Start Date End Date Taking? Authorizing Provider   aspirin 81 MG EC tablet Take 1 tablet by mouth daily 24   Trey Marshall MD   rosuvastatin (CRESTOR) 40 MG tablet Take 1 tablet by mouth nightly 24   Trey Marshall MD   UNABLE TO FIND Glycogen    ProviderRonak MD   NONFORMULARY immuplex  Patient not taking: Reported on 2024    ProviderRonak MD   magnesium oxide (MAG-OX) 400 MG tablet Take 1 tablet by mouth daily as needed    Ronak Wells MD   CALCIUM LACTATE PO Take by mouth as needed    Ronak Wells MD   UNABLE TO FIND Take 1 tablet by mouth 2 times daily Indications: NEUROPLEX    ProviderRonak MD   ibuprofen (ADVIL;MOTRIN) 200 MG tablet Take 1 tablet by mouth daily as needed for Pain    ProviderRonak MD       Current medications:    No current facility-administered medications for this encounter.     Current Outpatient Medications   Medication Sig Dispense Refill   • aspirin 81 MG EC tablet Take 1 tablet by mouth daily 90 tablet 3   • rosuvastatin (CRESTOR) 40 MG tablet Take 1 tablet by mouth nightly 90 tablet 3   • UNABLE TO FIND Glycogen     • NONFORMULARY immuplex (Patient not taking: Reported on 2024)     • magnesium oxide (MAG-OX) 400 MG tablet Take 1 tablet by mouth daily as needed     • CALCIUM LACTATE PO Take by mouth as needed     • UNABLE TO FIND Take 1 tablet by mouth 2 times daily Indications: NEUROPLEX     • ibuprofen (ADVIL;MOTRIN) 200 MG tablet Take 1 tablet by mouth daily as needed for Pain         Allergies:    Allergies   Allergen  The stress ECG was negative for ischemia.  ·  Stress Test: Hemodynamics are adequate for diagnosis. Blood pressure demonstrated a normal response and heart rate demonstrated a normal response to stress.    Echo 7/2024:  ·  Left Ventricle: Normal left ventricular systolic function with a visually estimated EF of 55 - 60%. Left ventricle size is normal. Normal wall thickness. Normal wall motion.  ·  Aortic Valve: Calcified cusps. Mild stenosis of the aortic valve. AV mean gradient is 11 mmHg. AV area by continuity VTI is 1.6 cm2.  ·  Pericardium: No pericardial effusion.       Neuro/Psych:   Negative Neuro/Psych ROS              GI/Hepatic/Renal:   (+) bowel prep          Endo/Other: Negative Endo/Other ROS                    Abdominal: normal exam            Vascular: negative vascular ROS.         Other Findings:         Anesthesia Plan      MAC     ASA 3       Induction: intravenous.                          STONE Gonzalez - CRNA   8/17/2024           Pre Anesthesia Assessment complete. Chart reviewed on 8/17/2024

## 2024-08-19 NOTE — PROGRESS NOTES
Spoke with patient and she will arrive at 0900 at [facility] on 8/20/2024 for her procedure at 1030, orders are in epic, went over prep time.

## 2024-08-20 ENCOUNTER — HOSPITAL ENCOUNTER (OUTPATIENT)
Age: 69
Setting detail: OUTPATIENT SURGERY
Discharge: HOME | End: 2024-08-20
Attending: INTERNAL MEDICINE | Admitting: INTERNAL MEDICINE
Payer: MEDICARE

## 2024-08-20 ENCOUNTER — ANESTHESIA (OUTPATIENT)
Dept: ENDOSCOPY | Age: 69
End: 2024-08-20
Payer: MEDICARE

## 2024-08-20 VITALS
RESPIRATION RATE: 16 BRPM | DIASTOLIC BLOOD PRESSURE: 76 MMHG | HEART RATE: 69 BPM | WEIGHT: 162 LBS | BODY MASS INDEX: 27.66 KG/M2 | OXYGEN SATURATION: 98 % | HEIGHT: 64 IN | TEMPERATURE: 97.4 F | SYSTOLIC BLOOD PRESSURE: 151 MMHG

## 2024-08-20 DIAGNOSIS — Z12.11 ENCOUNTER FOR SCREENING COLONOSCOPY: ICD-10-CM

## 2024-08-20 PROCEDURE — 2709999900 HC NON-CHARGEABLE SUPPLY: Performed by: INTERNAL MEDICINE

## 2024-08-20 PROCEDURE — 3700000001 HC ADD 15 MINUTES (ANESTHESIA): Performed by: INTERNAL MEDICINE

## 2024-08-20 PROCEDURE — 2580000003 HC RX 258: Performed by: NURSE PRACTITIONER

## 2024-08-20 PROCEDURE — 45385 COLONOSCOPY W/LESION REMOVAL: CPT | Performed by: INTERNAL MEDICINE

## 2024-08-20 PROCEDURE — 88305 TISSUE EXAM BY PATHOLOGIST: CPT | Performed by: PATHOLOGY

## 2024-08-20 PROCEDURE — 45380 COLONOSCOPY AND BIOPSY: CPT | Performed by: INTERNAL MEDICINE

## 2024-08-20 PROCEDURE — 3609010600 HC COLONOSCOPY POLYPECTOMY SNARE/COLD BIOPSY: Performed by: INTERNAL MEDICINE

## 2024-08-20 PROCEDURE — 7100000010 HC PHASE II RECOVERY - FIRST 15 MIN: Performed by: INTERNAL MEDICINE

## 2024-08-20 PROCEDURE — 6360000002 HC RX W HCPCS: Performed by: NURSE ANESTHETIST, CERTIFIED REGISTERED

## 2024-08-20 PROCEDURE — 3700000000 HC ANESTHESIA ATTENDED CARE: Performed by: INTERNAL MEDICINE

## 2024-08-20 PROCEDURE — 7100000011 HC PHASE II RECOVERY - ADDTL 15 MIN: Performed by: INTERNAL MEDICINE

## 2024-08-20 RX ORDER — ONDANSETRON 2 MG/ML
4 INJECTION INTRAMUSCULAR; INTRAVENOUS
Status: CANCELLED | OUTPATIENT
Start: 2024-08-20 | End: 2024-08-21

## 2024-08-20 RX ORDER — SODIUM CHLORIDE, SODIUM LACTATE, POTASSIUM CHLORIDE, CALCIUM CHLORIDE 600; 310; 30; 20 MG/100ML; MG/100ML; MG/100ML; MG/100ML
INJECTION, SOLUTION INTRAVENOUS CONTINUOUS
Status: DISCONTINUED | OUTPATIENT
Start: 2024-08-20 | End: 2024-08-20 | Stop reason: HOSPADM

## 2024-08-20 RX ORDER — SODIUM CHLORIDE 0.9 % (FLUSH) 0.9 %
5-40 SYRINGE (ML) INJECTION EVERY 12 HOURS SCHEDULED
Status: CANCELLED | OUTPATIENT
Start: 2024-08-20

## 2024-08-20 RX ORDER — SODIUM CHLORIDE 0.9 % (FLUSH) 0.9 %
5-40 SYRINGE (ML) INJECTION PRN
Status: CANCELLED | OUTPATIENT
Start: 2024-08-20

## 2024-08-20 RX ORDER — SODIUM CHLORIDE 0.9 % (FLUSH) 0.9 %
5-40 SYRINGE (ML) INJECTION EVERY 12 HOURS SCHEDULED
Status: DISCONTINUED | OUTPATIENT
Start: 2024-08-20 | End: 2024-08-20 | Stop reason: HOSPADM

## 2024-08-20 RX ORDER — PROPOFOL 10 MG/ML
INJECTION, EMULSION INTRAVENOUS PRN
Status: DISCONTINUED | OUTPATIENT
Start: 2024-08-20 | End: 2024-08-20 | Stop reason: SDUPTHER

## 2024-08-20 RX ORDER — LABETALOL HYDROCHLORIDE 5 MG/ML
10 INJECTION, SOLUTION INTRAVENOUS
Status: CANCELLED | OUTPATIENT
Start: 2024-08-20

## 2024-08-20 RX ORDER — HALOPERIDOL 5 MG/ML
1 INJECTION INTRAMUSCULAR
Status: CANCELLED | OUTPATIENT
Start: 2024-08-20 | End: 2024-08-21

## 2024-08-20 RX ORDER — LIDOCAINE HYDROCHLORIDE 20 MG/ML
INJECTION, SOLUTION INTRAVENOUS PRN
Status: DISCONTINUED | OUTPATIENT
Start: 2024-08-20 | End: 2024-08-20 | Stop reason: SDUPTHER

## 2024-08-20 RX ORDER — SODIUM CHLORIDE, SODIUM LACTATE, POTASSIUM CHLORIDE, CALCIUM CHLORIDE 600; 310; 30; 20 MG/100ML; MG/100ML; MG/100ML; MG/100ML
INJECTION, SOLUTION INTRAVENOUS CONTINUOUS
Status: CANCELLED | OUTPATIENT
Start: 2024-08-20

## 2024-08-20 RX ORDER — SODIUM CHLORIDE 9 MG/ML
INJECTION, SOLUTION INTRAVENOUS PRN
Status: DISCONTINUED | OUTPATIENT
Start: 2024-08-20 | End: 2024-08-20 | Stop reason: HOSPADM

## 2024-08-20 RX ORDER — SODIUM CHLORIDE 0.9 % (FLUSH) 0.9 %
5-40 SYRINGE (ML) INJECTION PRN
Status: DISCONTINUED | OUTPATIENT
Start: 2024-08-20 | End: 2024-08-20 | Stop reason: HOSPADM

## 2024-08-20 RX ORDER — SODIUM CHLORIDE 9 MG/ML
INJECTION, SOLUTION INTRAVENOUS PRN
Status: CANCELLED | OUTPATIENT
Start: 2024-08-20

## 2024-08-20 RX ADMIN — PROPOFOL 200 MG: 10 INJECTION, EMULSION INTRAVENOUS at 11:52

## 2024-08-20 RX ADMIN — SODIUM CHLORIDE, POTASSIUM CHLORIDE, SODIUM LACTATE AND CALCIUM CHLORIDE: 600; 310; 30; 20 INJECTION, SOLUTION INTRAVENOUS at 09:28

## 2024-08-20 RX ADMIN — LIDOCAINE HYDROCHLORIDE 100 MG: 20 INJECTION, SOLUTION INTRAVENOUS at 11:52

## 2024-08-20 ASSESSMENT — PAIN SCALES - GENERAL
PAINLEVEL_OUTOF10: 0

## 2024-08-20 NOTE — PROGRESS NOTES
Received report from RUSSELL Grissom. Patient alert and oriented. Verified patient's name, , allergies and procedure.  No beta blockers, no blood thinners, no implants. H&P on chart.  , Juan Manuel at bedside.

## 2024-08-20 NOTE — PROGRESS NOTES
Patient is back to baseline. Alert and oriented.  Side rails up x 2, call light in reach. Report given to RUSSELL Sutton.  Juan Manuel Johnson at bedside.

## 2024-08-20 NOTE — PROGRESS NOTES
Pt. Awake, alert, and oriented x 4. On room air, vs stable. Pt. Denies pain or nausea. Tolerating ice water. Pt. Ready to get dressed and be discharged home. Discharge instructions reviewed with pt. And . He will go down to get the car.

## 2024-08-20 NOTE — ANESTHESIA POSTPROCEDURE EVALUATION
Department of Anesthesiology  Postprocedure Note    Patient: Mae Ordoñez  MRN: [id number]  YOB: 1955  Date of evaluation: 8/20/2024    Procedure Summary       Date: 08/20/24 Room / Location: [place] [address] / [facility]    Anesthesia Start: 1138 Anesthesia Stop: 1213    Procedure: COLONOSCOPY POLYPECTOMY SNARE/BIOPSY  2 mm, 8 mm colon polyps at hepatic flexure; Diagnosis:       Encounter for screening colonoscopy      (Encounter for screening colonoscopy [Z12.11])    Surgeons: Storm Camacho MD Responsible Provider: Gonzales Pedroza MD    Anesthesia Type: MAC ASA Status: 3            Anesthesia Type: No value filed.    Pati Phase I: Pati Score: 10    Pati Phase II:      Anesthesia Post Evaluation    Patient location during evaluation: bedside  Patient participation: complete - patient participated  Level of consciousness: awake and alert  Pain score: 0  Airway patency: patent  Nausea & Vomiting: no nausea and no vomiting  Cardiovascular status: blood pressure returned to baseline and hemodynamically stable  Respiratory status: acceptable, room air and spontaneous ventilation  Hydration status: stable  Pain management: adequate and satisfactory to patient    No notable events documented.

## 2024-08-20 NOTE — H&P
ENDOSCOPY   Pre-operative History and Physical    Patient: Mae Ordoñez  : 1955      History Obtained From:  patient, electronic medical record        HISTORY OF PRESENT ILLNESS:                The patient is a 69 y.o. female with significant past medical history as below who presents for colonoscopy    Indication Screening colonoscopy.     Past Medical History:        Diagnosis Date    H/O Bell's palsy     H/O exercise stress test 10/03/2014    Negative    Menopause     Rosacea     Sleep apnea     uses C-Pap    Urinary incontinence     Vaginal dryness        Past Surgical History:        Procedure Laterality Date    COLPOSCOPY      CYST REMOVAL Left     ear    TUBAL LIGATION           Current Medications:    Medications    Prior to Admission medications    Medication Sig Start Date End Date Taking? Authorizing Provider   ibuprofen (ADVIL;MOTRIN) 200 MG tablet Take 1 tablet by mouth daily as needed for Pain   Yes Ronak Wells MD   aspirin 81 MG EC tablet Take 1 tablet by mouth daily 24   Trey Marshall MD   rosuvastatin (CRESTOR) 40 MG tablet Take 1 tablet by mouth nightly 24   Trey Marshall MD   UNABLE TO FIND Glycogen    Ronak Wells MD   NONFORMULARY immuplex  Patient not taking: Reported on 2024    Ronak Wells MD   magnesium oxide (MAG-OX) 400 MG tablet Take 1 tablet by mouth daily as needed    Ronak Wells MD   CALCIUM LACTATE PO Take by mouth as needed    Ronak Wells MD   UNABLE TO FIND Take 1 tablet by mouth 2 times daily Indications: NEUROPLEX    Ronak Wells MD      Scheduled Medications:    sodium chloride flush  5-40 mL IntraVENous 2 times per day     Infusions:    sodium chloride      lactated ringers IV soln 75 mL/hr at 24 0928     PRN Medications: sodium chloride flush, sodium chloride    Allergies:   Allergies   Allergen Reactions    Azithromycin Hives    Levaquin [Levofloxacin In D5w]

## 2024-08-20 NOTE — PROGRESS NOTES
1224 Pt. Brought back to unit, bedside report received from Jessica RN. Pt. Is A&O, VSS, pt. Provided with beverage, call light in reach. Spouse at bedside.

## 2024-08-20 NOTE — DISCHARGE INSTRUCTIONS
COLONOSCOPY        FOLLOW UP APPOINTMENT IN 1-2 WEEKS OR AS NEEDED.     REPEAT PROCEDURE IN 7-10 YEARS OR AS NEEDED.    PATHOLOGY PENDING     What to expect at home:  Your Recovery   Your doctor will tell you when you can eat and do your other usual activities Your doctor will talk to you about when you will need your next colonoscopy. Your doctor can help you decide how often you need to be checked. This will depend on the results of your test and your risk for colorectal cancer.  After the test, you may be bloated or have gas pains. You may need to pass gas. If a biopsy was done or a polyp was removed, you may have streaks of blood in your stool (feces) for a few days.  This care sheet gives you a general idea about how long it will take for you to recover. But each person recovers at a different pace. Follow the steps below to get better as quickly as possible.  How can you care for yourself at home?  Activity  Rest when you feel tired.  Diet  Follow your doctor's directions for eating.  Unless your doctor has told you not to, drink plenty of fluids. This helps to replace the fluids that were lost during the colon prep.  DO NOT DRINK ALCOHOL.  Medicines  Your doctor will tell you if and when you can restart your medicines. He or she will also give you instructions about taking any new medicines.  If you take blood thinners, such as warfarin (Coumadin), clopidogrel (Plavix), or aspirin, be sure to talk to your doctor. He or she will tell you if and when to start taking those medicines again. Make sure that you understand exactly what your doctor wants you to do.  If polyps were removed or a biopsy was done during the test, your doctor may tell you not to take aspirin or other anti-inflammatory medicines for a few days. These include ibuprofen (Advil, Motrin) and naproxen (Aleve).  Other instructions: Anethesia  For your safety, do not drive or operate machinery for 24 hours.  Do not sign legal documents or

## 2024-08-21 NOTE — RESULT ENCOUNTER NOTE
Surgery pathology revealed several polyps in the hepatic flexure they were noted to be precancerous therefore you will need a repeat colonoscopy in 7 years.

## 2024-08-30 PROBLEM — Z12.11 ENCOUNTER FOR SCREENING COLONOSCOPY: Status: RESOLVED | Noted: 2024-05-30 | Resolved: 2024-08-30

## 2024-09-11 ENCOUNTER — HOSPITAL ENCOUNTER (OUTPATIENT)
Dept: SLEEP CENTER | Age: 69
Discharge: HOME | End: 2024-09-11
Payer: MEDICARE

## 2024-09-11 DIAGNOSIS — Z87.891 EX-CIGARETTE SMOKER: ICD-10-CM

## 2024-09-11 DIAGNOSIS — G47.33 OSA (OBSTRUCTIVE SLEEP APNEA): Primary | ICD-10-CM

## 2024-09-11 DIAGNOSIS — G47.10 HYPERSOMNIA: ICD-10-CM

## 2024-09-11 DIAGNOSIS — E66.9 OBESITY (BMI 30-39.9): ICD-10-CM

## 2024-09-11 PROCEDURE — 99211 OFF/OP EST MAY X REQ PHY/QHP: CPT

## 2024-09-11 PROCEDURE — 99214 OFFICE O/P EST MOD 30 MIN: CPT | Performed by: INTERNAL MEDICINE

## 2024-09-11 ASSESSMENT — ENCOUNTER SYMPTOMS
BACK PAIN: 0
ABDOMINAL DISTENTION: 0
SHORTNESS OF BREATH: 0
EYE ITCHING: 0
COUGH: 0
EYE DISCHARGE: 0
ABDOMINAL PAIN: 0

## 2024-10-15 ENCOUNTER — OFFICE VISIT (OUTPATIENT)
Age: 69
End: 2024-10-15

## 2024-10-15 VITALS
BODY MASS INDEX: 27.83 KG/M2 | WEIGHT: 163 LBS | OXYGEN SATURATION: 99 % | DIASTOLIC BLOOD PRESSURE: 82 MMHG | RESPIRATION RATE: 18 BRPM | HEART RATE: 67 BPM | HEIGHT: 64 IN | SYSTOLIC BLOOD PRESSURE: 130 MMHG

## 2024-10-15 DIAGNOSIS — I25.10 CORONARY ARTERY DISEASE INVOLVING NATIVE CORONARY ARTERY OF NATIVE HEART WITHOUT ANGINA PECTORIS: Primary | ICD-10-CM

## 2024-10-15 DIAGNOSIS — I35.0 MILD AORTIC STENOSIS: ICD-10-CM

## 2024-10-15 DIAGNOSIS — M85.80 OSTEOPENIA, UNSPECIFIED LOCATION: ICD-10-CM

## 2024-10-15 ASSESSMENT — ENCOUNTER SYMPTOMS
COUGH: 0
NAUSEA: 0
STRIDOR: 0
ABDOMINAL PAIN: 0
BACK PAIN: 0
SHORTNESS OF BREATH: 0
BLOOD IN STOOL: 0
CHEST TIGHTNESS: 0
VOMITING: 0

## 2024-10-15 NOTE — PROGRESS NOTES
Mae Ordoñez (:  1955) is a 69 y.o. female,Established patient, here for evaluation of the following chief complaint(s):  3 Month Follow-Up       Assessment & Plan   ASSESSMENT/PLAN:  1. Coronary artery disease involving native coronary artery of native heart without angina pectoris  Stable, cont statin. Check lipids at f/u.  - Comprehensive Metabolic Panel; Future  - CBC; Future    2. Osteopenia, unspecified location  Check vit D at f/u, on Ca and Vit D OTC  - Vitamin D 25 Hydroxy; Future    3. Mild aortic stenosis  Control BP and interval monitoring q2year    Return in about 4 months (around 2/15/2025) for Labs 1 week prior to appointment, Interval follow-up.       Subjective   SUBJECTIVE/OBJECTIVE:  HPI  Mae Ordoñez is a 69 y.o. pleasant lady presenting today with a chief complaint of HL, ANA.    She has a past medical history significant for:  HL (LDL 30, TG 47 on 2022), not on statin   CAD on Cardiac CT, CAC score 607.   OA   Mild AS on TTE 2024, EF 55-60%, no wall or function deficits  ANA, on CPAP referred to [facility]   Bell's palsy L sided ()   Overweight  COVID-19 (2020, ?2021)  S/p tubal ligation   Former smoker (quit )   On multiple supplements through Chiropractor     # CSP  2mm,8mm polyp, both tubular adenoma  Mammogram 2024 LRADS2  DEXA  Osteopenic   Pap/HPV neg 2022   She was seeing Dr. Massey.Has h/o HGSIL in the past.  # Father with brain aneurysm/?AVM. Patient was having headaches in Dec 2021 but thinks she might have had COVID-19. Now her headaches have subsided.Eats a lot of salt.   # Patient has palpitations which cause her to feel light-headed. This occurs once every 3-4 months. This lasts for a few seconds. No known trigger.    History of Present Illness  The patient presents for evaluation of multiple medical concerns.    She reports occasional vertigo, which she manages with rolling maneuvers. She has realigned her crystals,

## 2024-10-16 ENCOUNTER — TELEPHONE (OUTPATIENT)
Age: 69
End: 2024-10-16

## 2024-10-16 DIAGNOSIS — R05.9 COUGH, UNSPECIFIED TYPE: ICD-10-CM

## 2024-10-16 RX ORDER — BENZONATATE 200 MG/1
200 CAPSULE ORAL 3 TIMES DAILY PRN
Qty: 30 CAPSULE | Refills: 0 | Status: SHIPPED | OUTPATIENT
Start: 2024-10-16

## 2024-10-16 NOTE — TELEPHONE ENCOUNTER
Patient called in ab out benzonatate prescription that was called in - she is not sure as to why this was called in - please advise

## 2024-12-09 ENCOUNTER — OFFICE VISIT (OUTPATIENT)
Dept: PULMONOLOGY | Age: 69
End: 2024-12-09
Payer: MEDICARE

## 2024-12-09 VITALS
WEIGHT: 154 LBS | OXYGEN SATURATION: 97 % | DIASTOLIC BLOOD PRESSURE: 76 MMHG | BODY MASS INDEX: 26.43 KG/M2 | HEART RATE: 94 BPM | SYSTOLIC BLOOD PRESSURE: 122 MMHG | RESPIRATION RATE: 18 BRPM

## 2024-12-09 DIAGNOSIS — G47.10 HYPERSOMNIA: ICD-10-CM

## 2024-12-09 DIAGNOSIS — E66.9 OBESITY (BMI 30-39.9): ICD-10-CM

## 2024-12-09 DIAGNOSIS — Z87.891 EX-CIGARETTE SMOKER: ICD-10-CM

## 2024-12-09 DIAGNOSIS — G47.33 OSA (OBSTRUCTIVE SLEEP APNEA): Primary | ICD-10-CM

## 2024-12-09 PROCEDURE — 1123F ACP DISCUSS/DSCN MKR DOCD: CPT | Performed by: INTERNAL MEDICINE

## 2024-12-09 PROCEDURE — 1159F MED LIST DOCD IN RCRD: CPT | Performed by: INTERNAL MEDICINE

## 2024-12-09 PROCEDURE — 99214 OFFICE O/P EST MOD 30 MIN: CPT | Performed by: INTERNAL MEDICINE

## 2024-12-09 ASSESSMENT — ENCOUNTER SYMPTOMS
ABDOMINAL DISTENTION: 0
EYE DISCHARGE: 0
EYE ITCHING: 0
SHORTNESS OF BREATH: 0
BACK PAIN: 0
ABDOMINAL PAIN: 0
COUGH: 0

## 2024-12-09 NOTE — PROGRESS NOTES
Mae Ordoñez  1955  Referring Provider: Trey Ashford, [facility] - General     Subjective:     Chief Complaint   Patient presents with    Follow-up       HPI  Mae is a 69 y.o. female has come back as a follow up. She has Moderate ANA on a CPAP of 9 cm h20 which she is using it every night about 7 to 9 hours. She says that it is helping her. She has no loss of weight. She hasa  nasal pillows mask. She is not sleepy or tired during the day time. Her 2 week download data showed a residual AHI is 0.8 and leak is 17 L.min.     Current Outpatient Medications   Medication Sig Dispense Refill    Vitamin D3 125 MCG (5000 UT) TABS tablet Take 1 tablet by mouth daily      rosuvastatin (CRESTOR) 40 MG tablet Take 1 tablet by mouth nightly 90 tablet 3    UNABLE TO FIND Glycogen      magnesium oxide (MAG-OX) 400 MG tablet Take 1 tablet by mouth daily as needed      CALCIUM LACTATE PO Take by mouth as needed      UNABLE TO FIND Take 1 tablet by mouth 2 times daily Indications: NEUROPLEX      ibuprofen (ADVIL;MOTRIN) 200 MG tablet Take 1 tablet by mouth daily as needed for Pain      benzonatate (TESSALON) 200 MG capsule Take 1 capsule by mouth three times daily as needed for cough 30 capsule 0    NONFORMULARY immuplex       No current facility-administered medications for this visit.       Allergies   Allergen Reactions    Azithromycin Hives    Levaquin [Levofloxacin In D5w]     Quinolones Hives    Penicillins Hives       Past Medical History:   Diagnosis Date    H/O Bell's palsy     H/O exercise stress test 10/03/2014    Negative    Menopause     Rosacea     Sleep apnea     uses C-Pap    Urinary incontinence     Vaginal dryness        Past Surgical History:   Procedure Laterality Date    COLONOSCOPY N/A 8/20/2024    COLONOSCOPY POLYPECTOMY SNARE/BIOPSY  2 mm, 8 mm colon polyps at hepatic flexure; performed by Storm Camacho MD at [facility] ENDOSCOPY    COLPOSCOPY      CYST REMOVAL Left     ear    TUBAL LIGATION

## 2025-02-07 ENCOUNTER — HOSPITAL ENCOUNTER (EMERGENCY)
Age: 70
Discharge: HOME | End: 2025-02-07
Payer: COMMERCIAL

## 2025-02-07 ENCOUNTER — APPOINTMENT (OUTPATIENT)
Dept: GENERAL RADIOLOGY | Age: 70
End: 2025-02-07
Payer: COMMERCIAL

## 2025-02-07 VITALS
BODY MASS INDEX: 27.66 KG/M2 | DIASTOLIC BLOOD PRESSURE: 78 MMHG | OXYGEN SATURATION: 97 % | RESPIRATION RATE: 17 BRPM | SYSTOLIC BLOOD PRESSURE: 138 MMHG | TEMPERATURE: 98.1 F | HEART RATE: 76 BPM | WEIGHT: 162 LBS | HEIGHT: 64 IN

## 2025-02-07 DIAGNOSIS — S61.259A BAT BITE OF FINGER, INITIAL ENCOUNTER: Primary | ICD-10-CM
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
## 2025-02-07 DIAGNOSIS — Z23 NEED FOR PROPHYLACTIC VACCINATION AND INOCULATION AGAINST RABIES: ICD-10-CM

## 2025-02-07 PROCEDURE — 6360000002 HC RX W HCPCS: Performed by: PHYSICIAN ASSISTANT

## 2025-02-07 PROCEDURE — 96372 THER/PROPH/DIAG INJ SC/IM: CPT

## 2025-02-07 PROCEDURE — 90375 RABIES IG IM/SC: CPT | Performed by: PHYSICIAN ASSISTANT

## 2025-02-07 PROCEDURE — 90675 RABIES VACCINE IM: CPT | Performed by: PHYSICIAN ASSISTANT

## 2025-02-07 PROCEDURE — 99284 EMERGENCY DEPT VISIT MOD MDM: CPT

## 2025-02-07 PROCEDURE — 90471 IMMUNIZATION ADMIN: CPT | Performed by: PHYSICIAN ASSISTANT

## 2025-02-07 PROCEDURE — 64450 NJX AA&/STRD OTHER PN/BRANCH: CPT

## 2025-02-07 PROCEDURE — 6370000000 HC RX 637 (ALT 250 FOR IP): Performed by: PHYSICIAN ASSISTANT

## 2025-02-07 PROCEDURE — 73140 X-RAY EXAM OF FINGER(S): CPT

## 2025-02-07 RX ORDER — METRONIDAZOLE 250 MG/1
500 TABLET ORAL ONCE
Status: COMPLETED | OUTPATIENT
Start: 2025-02-07 | End: 2025-02-07

## 2025-02-07 RX ORDER — DOXYCYCLINE HYCLATE 100 MG
100 TABLET ORAL 2 TIMES DAILY
Qty: 14 TABLET | Refills: 0 | Status: SHIPPED | OUTPATIENT
Start: 2025-02-07 | End: 2025-02-14

## 2025-02-07 RX ORDER — METRONIDAZOLE 500 MG/1
500 TABLET ORAL 3 TIMES DAILY
Qty: 21 TABLET | Refills: 0 | Status: SHIPPED | OUTPATIENT
Start: 2025-02-07 | End: 2025-02-14

## 2025-02-07 RX ORDER — LIDOCAINE HYDROCHLORIDE 10 MG/ML
5 INJECTION, SOLUTION INFILTRATION; PERINEURAL ONCE
Status: COMPLETED | OUTPATIENT
Start: 2025-02-07 | End: 2025-02-07

## 2025-02-07 RX ORDER — FLUCONAZOLE 150 MG/1
150 TABLET ORAL DAILY
Qty: 2 TABLET | Refills: 0 | Status: SHIPPED | OUTPATIENT
Start: 2025-02-07 | End: 2025-02-09

## 2025-02-07 RX ORDER — DOXYCYCLINE HYCLATE 100 MG
100 TABLET ORAL EVERY 12 HOURS SCHEDULED
Status: DISCONTINUED | OUTPATIENT
Start: 2025-02-07 | End: 2025-02-07 | Stop reason: HOSPADM

## 2025-02-07 RX ADMIN — RABIES VACCINE 1 ML: KIT at 20:54

## 2025-02-07 RX ADMIN — RABIES IMMUNE GLOBULIN (HUMAN) 1470 UNITS: 300 INJECTION, SOLUTION INFILTRATION; INTRAMUSCULAR at 20:47

## 2025-02-07 RX ADMIN — DOXYCYCLINE HYCLATE 100 MG: 100 TABLET, COATED ORAL at 20:08

## 2025-02-07 RX ADMIN — LIDOCAINE HYDROCHLORIDE 5 ML: 10 INJECTION, SOLUTION INFILTRATION; PERINEURAL at 20:37

## 2025-02-07 RX ADMIN — METRONIDAZOLE 500 MG: 250 TABLET ORAL at 20:08

## 2025-02-07 ASSESSMENT — PAIN SCALES - GENERAL: PAINLEVEL_OUTOF10: 1

## 2025-02-07 ASSESSMENT — PAIN DESCRIPTION - DESCRIPTORS: DESCRIPTORS: THROBBING

## 2025-02-07 ASSESSMENT — LIFESTYLE VARIABLES
HOW MANY STANDARD DRINKS CONTAINING ALCOHOL DO YOU HAVE ON A TYPICAL DAY: 1 OR 2
HOW OFTEN DO YOU HAVE A DRINK CONTAINING ALCOHOL: MONTHLY OR LESS

## 2025-02-07 ASSESSMENT — PAIN - FUNCTIONAL ASSESSMENT
PAIN_FUNCTIONAL_ASSESSMENT: 0-10
PAIN_FUNCTIONAL_ASSESSMENT: NONE - DENIES PAIN

## 2025-02-07 ASSESSMENT — PAIN DESCRIPTION - LOCATION: LOCATION: FINGER (COMMENT WHICH ONE)

## 2025-02-07 ASSESSMENT — PAIN DESCRIPTION - ORIENTATION: ORIENTATION: RIGHT

## 2025-02-07 ASSESSMENT — PAIN DESCRIPTION - FREQUENCY: FREQUENCY: INTERMITTENT

## 2025-02-07 NOTE — ED PROVIDER NOTES
[facility] EMERGENCY DEPARTMENT  EMERGENCY DEPARTMENT ENCOUNTER        Pt Name: Mae Ordoñez  MRN: [id number]  Birthdate 1955  Date of evaluation: 2/7/2025  Provider: Louis Medel PA-C  PCP: Trey Ashford MD      REFUGIO. I have evaluated this patient.        CHIEF COMPLAINT      Chief Complaint   Patient presents with    Animal Bite     Bat at 1300       HISTORY OF PRESENT ILLNESS:     History from : Patient    Limitations to history : None    Mae Ordoñez is a 70 y.o. female who presents with complaint of a single bat bite to her right fifth finger.  She mentions that a couple weeks ago t she was cleaning her [employer] that has bats, and she had picked up some thought to be dead bats in a container, and she was transporting them car.  Apparently one of them had to crawled out, and into the dashboard and apparently had been residing in the car.  She mentions at one point he had crawled out on [employer] in her leg.  But today she describes that they were driving in the car  out and bit her in the finger.  This was through did gloves that she has been wearing.  She feels foreign body sensation and  is concerned for retained tooth She does describe washing and cleansing with antiseptics.  She attempted to call the health department was vies come to emergency department for rabies prophylaxis.   and she does tell me her tetanus is up-to-date.She denies any previus rabies vaccine series,   Any immunocompromise conditionssevere pain difficulty bending or straightening her finger, nausea, vomiting, recent illness or any other concerns.       Nursing Notes were all reviewed and agreed with or any disagreements were addressed in the HPI.    REVIEW OF SYSTEMS :     Review of Systems   All other systems reviewed and are negative.      Pertinent positives and negatives are stated within HPI    PAST HISTORY   has a past medical history of H/O Bell's palsy, H/O exercise stress test,

## 2025-02-07 NOTE — ED TRIAGE NOTES
Arrived to ED C/O Animal Bite (Bat) at 1300. Right hand 5th digit. Believes could have rabies. Has bat in container inside of car.

## 2025-02-08 NOTE — DISCHARGE INSTRUCTIONS
Monitor your finger for any signs of skin infection including redness, swelling, red streaks, worsening pain, fevers.  Follow-up with your doctor for reevaluation if you notice any symptoms.    Follow-up with your doctor to discuss your visit to the emergency department and any ongoing monitoring of your symptoms.    As we discussed today you will need to complete the rabies vaccines and will require additional 3 shots.  Today is day 0.  You will need to have a shot on day 3, day 7, day 14.      How to Complete the Rabies Vaccine Series:    Your provider has given you a prescription for the remaining three doses of the rabies vaccine. You need to receive these on DAY #3, DAY #7, and DAY #14, starting from TODAY as DAY #0.   It is VERY important that you receive all doses in the vaccine series.    You may obtain the vaccine at one of the following locations:     [facility]:  Phone: [phone]  Website: [email or web address]  Address: [address] [place] [address]  Hours: Monday through Friday from 8:00 am to 5:00 pm  Instructions:  Immunizations by appointment.  Call [phone] to schedule.  Please bring photo ID, insurance card(s), immunization record and ED paperwork.  [facility] does not accept all insurances and cannot bill Worker's Comp.  Payment is due at time of service.   For insurances that are billed, if the medical deductible has not been met, the patient will be billed for services rendered.      [facility] - Immunization Clinic  Phone: [phone]  Website: [email or web address]  Hours: Monday through Friday from 8:00 am to 4:30 pm  NOTE: The clinic is closed daily from 11:30am-12:30pm and the second and fourth Friday of every month from 8:00am-9:00am.  Instructions:  You must call to make appointment for each injection.  Bring the ED order for the follow-up vaccine to the first appointment at the health department.  Bring your discharge instructions

## 2025-02-10 ENCOUNTER — TELEPHONE (OUTPATIENT)
Age: 70
End: 2025-02-10

## 2025-02-10 ENCOUNTER — HOSPITAL ENCOUNTER (OUTPATIENT)
Dept: INFUSION THERAPY | Age: 70
Setting detail: INFUSION SERIES
Discharge: HOME | End: 2025-02-10
Payer: COMMERCIAL

## 2025-02-10 VITALS
OXYGEN SATURATION: 98 % | SYSTOLIC BLOOD PRESSURE: 144 MMHG | DIASTOLIC BLOOD PRESSURE: 73 MMHG | HEART RATE: 71 BPM | TEMPERATURE: 97.3 F | RESPIRATION RATE: 18 BRPM

## 2025-02-10 DIAGNOSIS — S61.259A ANIMAL BITE OF FINGER, INITIAL ENCOUNTER: Primary | ICD-10-CM
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
## 2025-02-10 DIAGNOSIS — Z23 ENCOUNTER FOR VACCINATION: ICD-10-CM

## 2025-02-10 DIAGNOSIS — S61.259A BAT BITE OF FINGER, INITIAL ENCOUNTER: Primary | ICD-10-CM

## 2025-02-10 PROCEDURE — 90471 IMMUNIZATION ADMIN: CPT | Performed by: PHYSICIAN ASSISTANT

## 2025-02-10 PROCEDURE — 90675 RABIES VACCINE IM: CPT | Performed by: PHYSICIAN ASSISTANT

## 2025-02-10 PROCEDURE — 6360000002 HC RX W HCPCS: Performed by: PHYSICIAN ASSISTANT

## 2025-02-10 PROCEDURE — 96372 THER/PROPH/DIAG INJ SC/IM: CPT

## 2025-02-10 RX ADMIN — RABIES VACCINE 1 ML: KIT at 14:37

## 2025-02-10 NOTE — TELEPHONE ENCOUNTER
Care Transitions Initial Follow Up Call    Outreach made within 2 business days of discharge: Yes    Patient: Mae Ordoñez Patient : 1955   MRN: [id number]  Reason for Admission: bat bite  Discharge Date: 25       Spoke with: voicemail left for patient to contact the office    Discharge department/facility: [facility] ed    TCM Interactive Patient Contact:    Additional needs identified to be addressed with provider  Voicemail received left message  for patient to contact office - restart new encounter when patient calls back or when reach out to patient again             Scheduled appointment with PCP within 7-14 days    Follow Up  Future Appointments   Date Time Provider Department Center   2025  8:30 AM INFUSION ROOM 3 - [facility]   2025  9:30 AM Trey Ashford MD karma [facility] ECC DEP   2025  8:30 AM INFUSION ROOM CHAIR 1 - [facility]   2025  9:00 AM Trey Ashford MD karma [facility] ECC DEP   2025  1:45 PM Orion Draper MD [facility]       Marta Aguirre MA

## 2025-02-10 NOTE — TELEPHONE ENCOUNTER
Care Transitions Initial Follow Up Call    Outreach made within 2 business days of discharge: Yes    Patient: Mae Ordoñez Patient : 1955   MRN: [id number]  Reason for Admission: admitted in error per note of 2024  Discharge Date: 25       Spoke with: no one    Discharge department/facility: [facility] ed    TCM Interactive Patient Contact:    Additional needs identified to be addressed with provider  Per note 2025 admitted in error patient seen on 2025             Scheduled appointment with PCP within 7-14 days    Follow Up  Future Appointments   Date Time Provider Department Center   2025  8:30 AM INFUSION ROOM 3 - [facility]   2025  9:30 AM Trey Ashford MD [facility] ECC DEP   2025  8:30 AM INFUSION ROOM CHAIR 1 - [facility]   2025  9:00 AM Trey Ashford MD karma [facility] ECC DEP   2025  1:45 PM Orion Draper MD [facility]       Marta Aguirre MA    Hematology/Oncology  Progress Note    Name: Tess Love    : 1976    PCP: Bhupendra Higgins DO     Ms. Satnam London is a 39year old female who was seen for management of her secondary polycythemia. 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 She reports that she has been working on decrease her cigarette smoking. Today she has no other complaints. She reports she continues to smoke, about 10 cig a day. She denied new complaints since last visit. Denied fever, chills, night sweat, unintentional weight loss, skin lumps or bumps, acute bleeding or bruising issues. No acute bleeding, blood in stool, dark stool, melena, hematochezia, hemoptysis, dark urine, or easily bruising. Denied headache, acute vision change, dizziness, chest pain, worsen shortness of breath, palpitation, productive cough, nausea, vomiting, abdominal pain, altered bowel habits, dysuria, new bone pain or back pain, focal numbness or weakness. Past medical history, family history, and social history: these were reviewed and remains unchanged.     Past Medical History:   Diagnosis Date    Discoid lupus     skin lupus    Hypertension     during pregnancy/6 months after    IBS (irritable bowel syndrome) 2016    Menorrhagia     hysterectomy    Polycythemia      Past Surgical History:   Procedure Laterality Date    DELIVERY       HX DILATION AND CURETTAGE      HX GYN      laproscopy    HX HYSTERECTOMY       Social History     Socioeconomic History    Marital status:      Spouse name: Not on file    Number of children: Not on file    Years of education: Not on file    Highest education level: Not on file   Occupational History    Occupation:    Tobacco Use    Smoking status: Former Smoker     Packs/day: 0.50     Years: 15.00     Pack years: 7.50     Types: Cigarettes    Smokeless tobacco: Former User     Quit date: 4/1/2019    Tobacco comment: 1998   Substance and Sexual Activity    Alcohol use: Yes     Alcohol/week: 0.0 standard drinks     Types: 1 - 4 Glasses of wine per week     Comment: occ    Drug use: No    Sexual activity: Yes     Partners: Male     Birth control/protection: None   Other Topics Concern    Not on file   Social History Narrative    Not on file     Social Determinants of Health     Financial Resource Strain:     Difficulty of Paying Living Expenses:    Food Insecurity:     Worried About Running Out of Food in the Last Year:     [address] in the Last Year:    Transportation Needs:     Lack of Transportation (Medical):  Lack of Transportation (Non-Medical):    Physical Activity:     Days of Exercise per Week:     Minutes of Exercise per Session:    Stress:     Feeling of Stress :    Social Connections:     Frequency of Communication with Friends and Family:     Frequency of Social Gatherings with Friends and Family:     Attends Worship Services:     Active Member of Clubs or Organizations:     Attends Club or Organization Meetings:     Marital Status:    Intimate Partner Violence:     Fear of Current or Ex-Partner:     Emotionally Abused:     Physically Abused:     Sexually Abused:      Family History   Problem Relation Age of Onset    Alcohol abuse Mother     Drug Abuse Mother    Pauline Roach Mother     Hypertension Father     Hypertension Sister     High Cholesterol Sister     Dementia Sister     Stroke Sister     Arthritis-rheumatoid Maternal Grandmother     Alcohol abuse Maternal Grandfather     Arthritis-rheumatoid Maternal Grandfather     Other Maternal Grandfather         ruptured bowel    Stroke Sister      Current Outpatient Medications   Medication Sig Dispense Refill    olodateroL (STRIVERDI) 2.5 mcg/actuation mist Take 2 Puffs by inhalation every twenty-four (24) hours.  1 Inhaler 11    buPROPion SR (Wellbutrin SR) 100 mg SR tablet Take 100 mg by mouth two (2) times a day.  levocetirizine (Xyzal) 5 mg tablet Take 5 mg by mouth daily as needed for Allergies.  busPIRone (BUSPAR) 10 mg tablet Take 1 Tab by mouth two (2) times a day.  albuterol (PROVENTIL HFA, VENTOLIN HFA, PROAIR HFA) 90 mcg/actuation inhaler Take 2 Puffs by inhalation every four (4) hours as needed for Wheezing. 1 Inhaler 2    OTHER monthly Allergy Injections, which started In January 2019      ALPRAZolam [place] Aw) 0.5 mg tablet Take 1/2 to 1 tab daily as needed for anxiety MDD: 1 tab 30 Tab 0    traZODone (DESYREL) 100 mg tablet Take 1 Tab by mouth nightly. (Patient not taking: Reported on 6/21/2021)      clobetasoL (TEMOVATE) 0.05 % ointment Apply  to affected area two (2) times a day. (Patient not taking: Reported on 6/21/2021) 15 g 0       Review of Systems   Constitutional: Negative for chills, diaphoresis, fever, malaise/fatigue and weight loss. Respiratory: Negative for cough, hemoptysis, shortness of breath and wheezing. Cardiovascular: Negative for chest pain, palpitations and leg swelling. Gastrointestinal: Negative for abdominal pain, diarrhea, heartburn, nausea and vomiting. Genitourinary: Negative for dysuria, frequency, hematuria and urgency. Musculoskeletal: Negative for joint pain and myalgias. Skin: Negative for itching and rash. Neurological: Negative for dizziness, seizures, weakness and headaches. Psychiatric/Behavioral: Negative for depression. The patient does not have insomnia.              Objective:     Visit Vitals  /84 (BP 1 Location: Left upper arm, BP Patient Position: Sitting)   Pulse 97   Temp 98.4 °F (36.9 °C) (Temporal)   Resp 14   Ht 5' 8\" (1.727 m)   Wt 98.7 kg (217 lb 9.6 oz)   LMP 10/29/2014   SpO2 99%   BMI 33.09 kg/m²       ECOG Performance Status (grade): 0  0 - able to carry on all pre-disease activity w/out restriction  1 - restricted but able to carry out light work  2 - ambulatory and can self- care but unable to carry out work  3 - bed or chair >50% of waking hours  4 - completely disable, total care, confined to bed or chair    Physical Exam  Constitutional:       Appearance: Normal appearance. HENT:      Head: Normocephalic and atraumatic. Eyes:      Pupils: Pupils are equal, round, and reactive to light. Cardiovascular:      Rate and Rhythm: Normal rate and regular rhythm. Heart sounds: Normal heart sounds. Pulmonary:      Effort: Pulmonary effort is normal.      Breath sounds: Normal breath sounds. Abdominal:      General: Bowel sounds are normal.      Palpations: Abdomen is soft. Tenderness: There is no abdominal tenderness. There is no guarding. Musculoskeletal:         General: Normal range of motion. Cervical back: Neck supple. Right lower leg: No edema. Left lower leg: No edema. Skin:     General: Skin is warm. Neurological:      General: No focal deficit present. Mental Status: She is alert and oriented to person, place, and time. Mental status is at baseline. Diagnostics:      No results found for this or any previous visit (from the past 96 hour(s)). Imaging:  No results found for this or any previous visit. Results for orders placed during the hospital encounter of 07/26/19    XR CHEST PA LAT    Narrative  EXAM: CHEST PA AND LATERAL    CLINICAL HISTORY/INDICATION: Sudden onset right-sided chest pain with right arm  pain, tachycardia, and near syncopal event onset today associated with shortness  of breath    COMPARISON: Test x-ray October 19, 2018. TECHNIQUE: PA and lateral views    FINDINGS:    The cardiac and mediastinal silhouette is normal.  The lungs are clear. The  costophrenic angles are sharply defined. Pulmonary vascularity is normal. No  bony abnormalities are seen.     Impression  IMPRESSION:    Negative chest.      Results for orders placed during the hospital encounter of 06/17/20    CT SINUSES WO CONT    Narrative  CT paranasal sinuses    HISTORY: Chronic maxillary sinusitis. COMPARISON: None. TECHNIQUE: Contiguous axial helical scan to the paranasal sinuses is obtained. Computer coronal and sagittal reconstruction images are also performed for  better evaluation of paranasal sinus mucosa and bony structures from different  projection and also of relationship of sinuses to adjacent structures as well as  for reducing radiation dose. All CT scans at this facility performed using dose optimization techniques as  appreciated to a performed exam, to include automated exposure control,  adjustment of the mA and or KU according to patient size (including appropriate  matching for site specific examination), or use of iterative reconstruction  technique. FINDINGS: The nasal septum is minimally deviated to the right. . The bilateral  nasal cavities appear patent. Mild mucosal thickening identified in all the  paranasal sinuses. The bilateral osteomeatal complexes appear patent. No  evidence of soft tissue mass or air fluid level identified. Visualized portion  of facial bones and both orbits appear unremarkable. Impression  IMPRESSION:    Mild mucosal thickening in paranasal sinuses. No CT evidence of acute sinusitis. Thank you for your referral.          Assessment:     1. Polycythemia, secondary    2. Polycythemia      Plan:   Polycythemia:   --  Patient was being followed by Dr. Wolfgang Goldberg who retired. 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 Previous workup showed negative JAK2. She did receive some phlebotomy in the past.  -- 11/6/2020 CBC reported hemoglobin 14.8, hematocrit 44.1%. -- Clinical stable. Today she reports she continues to smoke, about 10 cig a day. She was agreeable for phlebotomy at 6-week schedule. -- We will continue to monitor CBC.   -- She was advised to f/u her PCP for smoking cessation. Anxiety:   -- She will f/u PCP.      -- We will see the patient back in clinic in about 4-6 months. Always sooner if required. The patient can have lab done prior to our next clinic visit. No orders of the defined types were placed in this encounter. Ms. Ellie Patterson has a reminder for a \"due or due soon\" health maintenance. I have asked that she contact her primary care provider for follow-up on this health maintenance. All of patient's questions answered to their apparent satisfaction. They verbally show understanding and agreement with aforementioned plan. Carly Stacy MD  6/21/2021            Parts of this document has been produced using Dragon dictation system. Unrecognized errors in transcription may be present. Please do not hesitate to reach out for any questions or clarifications.       CC: Memo Stovall MD

## 2025-02-10 NOTE — TELEPHONE ENCOUNTER
Care Transitions Initial Follow Up Call    Outreach made within 2 business days of discharge: Yes    Patient: Mae Ordoñez Patient : 1955   MRN: [id number]  Reason for Admission: bat bite  Discharge Date: 25       Spoke with: patient    Discharge department/facility: [facility] ed    TCM Interactive Patient Contact:  Was patient able to fill all prescriptions: Yes  Was patient instructed to bring all medications to the follow-up visit: Yes  Is patient taking all medications as directed in the discharge summary? Yes  Does patient understand their discharge instructions: Yes  Does patient have questions or concerns that need addressed prior to 7-14 day follow up office visit: no    Additional needs identified to be addressed with provider  Patient is getting proper treatment has 2 more infusions to get - she has appt on the  with Dr. Ashford will do ed fu             Scheduled appointment with PCP within 7-14 days    Follow Up  Future Appointments   Date Time Provider Department Center   2025  8:30 AM INFUSION ROOM 3 - [facility] [place]   2025  9:30 AM Trey Ashford MD karma [facility] ECC DEP   2025  8:30 AM INFUSION ROOM CHAIR 1 - [facility] [place]   2025  9:00 AM Trey Ashford MD [facility] ECC DEP   2025  [facility]45 [facility]       Marta Aguirre MA

## 2025-02-10 NOTE — TELEPHONE ENCOUNTER
Care Transitions Initial Follow Up Call    Outreach made within 2 business days of discharge: Yes    Patient: Mae Ordoñez Patient : 1955   MRN: [id number]  Reason for Admission: bat bite   Discharge Date: 2025       Spoke with: voicemail left for patient to contact office     Discharge department/facility: [facility] ed     TCM Interactive Patient Contact:  WAdditional needs identified to be addressed with provider  Voicemail left for patient to contact office             Scheduled appointment with PCP within 7-14 days    Follow Up  Future Appointments   Date Time Provider Department Center   2025  8:30 AM INFUSION ROOM 3 - [facility]   2025  9:30 AM Trey Ashford MD [facility] ECC DEP   2025  8:30 AM INFUSION ROOM CHAIR 1 - [facility]   2025  9:00 AM Trey Ashford MD karma [facility] ECC DEP   2025  1:45 PM Orion Draper MD [facility]       Marta Aguirre MA

## 2025-02-10 NOTE — PROGRESS NOTES
Tolerated injection well. Reviewed discharge instruction, voiced understanding. Copies of AVS given. Pt discharged home. Pt to exit via by steady gait.     Orders Placed This Encounter   Medications    rabies vaccine, [facility] (RABAVERT) injection 1 mL

## 2025-02-11 ENCOUNTER — HOSPITAL ENCOUNTER (OUTPATIENT)
Age: 70
Discharge: HOME | End: 2025-02-11
Payer: COMMERCIAL

## 2025-02-11 DIAGNOSIS — I25.10 CORONARY ARTERY DISEASE INVOLVING NATIVE CORONARY ARTERY OF NATIVE HEART WITHOUT ANGINA PECTORIS: ICD-10-CM

## 2025-02-11 DIAGNOSIS — M85.80 OSTEOPENIA, UNSPECIFIED LOCATION: ICD-10-CM

## 2025-02-11 LAB
25(OH)D3 SERPL-MCNC: 63.2 NG/ML (ref 30–150)
ALBUMIN SERPL-MCNC: 4.3 G/DL (ref 3.4–5)
ALBUMIN/GLOB SERPL: 1.5 {RATIO} (ref 1.1–2.2)
ALP SERPL-CCNC: 52 U/L (ref 40–129)
ALT SERPL-CCNC: 33 U/L (ref 10–40)
ANION GAP SERPL CALCULATED.3IONS-SCNC: 9 MMOL/L (ref 9–17)
AST SERPL-CCNC: 39 U/L (ref 15–37)
BILIRUB SERPL-MCNC: 0.6 MG/DL (ref 0–1)
BUN SERPL-MCNC: 12 MG/DL (ref 7–20)
CALCIUM SERPL-MCNC: 10 MG/DL (ref 8.3–10.6)
CHLORIDE SERPL-SCNC: 106 MMOL/L (ref 99–110)
CO2 SERPL-SCNC: 27 MMOL/L (ref 21–32)
CREAT SERPL-MCNC: 0.7 MG/DL (ref 0.6–1.2)
ERYTHROCYTE [DISTWIDTH] IN BLOOD BY AUTOMATED COUNT: 14.1 % (ref 11.7–14.9)
GFR, ESTIMATED: 79 ML/MIN/1.73M2
GLUCOSE SERPL-MCNC: 124 MG/DL (ref 74–99)
HCT VFR BLD AUTO: 42.2 % (ref 37–47)
HGB BLD-MCNC: 13.6 G/DL (ref 12.5–16)
MCH RBC QN AUTO: 29.8 PG (ref 27–31)
MCHC RBC AUTO-ENTMCNC: 32.2 G/DL (ref 32–36)
MCV RBC AUTO: 92.5 FL (ref 78–100)
PLATELET # BLD AUTO: 271 K/UL (ref 140–440)
PMV BLD AUTO: 10 FL (ref 7.5–11.1)
POTASSIUM SERPL-SCNC: 4.1 MMOL/L (ref 3.5–5.1)
PROT SERPL-MCNC: 7.2 G/DL (ref 6.4–8.2)
RBC # BLD AUTO: 4.56 M/UL (ref 4.2–5.4)
SODIUM SERPL-SCNC: 142 MMOL/L (ref 136–145)
WBC OTHER # BLD: 6.6 K/UL (ref 4–10.5)

## 2025-02-11 PROCEDURE — 80053 COMPREHEN METABOLIC PANEL: CPT

## 2025-02-11 PROCEDURE — 85027 COMPLETE CBC AUTOMATED: CPT

## 2025-02-11 PROCEDURE — 82306 VITAMIN D 25 HYDROXY: CPT

## 2025-02-14 ENCOUNTER — HOSPITAL ENCOUNTER (OUTPATIENT)
Dept: INFUSION THERAPY | Age: 70
Setting detail: INFUSION SERIES
Discharge: HOME | End: 2025-02-14
Payer: COMMERCIAL

## 2025-02-14 VITALS
OXYGEN SATURATION: 97 % | RESPIRATION RATE: 18 BRPM | HEART RATE: 80 BPM | DIASTOLIC BLOOD PRESSURE: 90 MMHG | SYSTOLIC BLOOD PRESSURE: 130 MMHG | TEMPERATURE: 97.5 F

## 2025-02-14 DIAGNOSIS — Z23 ENCOUNTER FOR VACCINATION: ICD-10-CM

## 2025-02-14 DIAGNOSIS — S61.259A ANIMAL BITE OF FINGER, INITIAL ENCOUNTER: Primary | ICD-10-CM

## 2025-02-14 PROCEDURE — 90471 IMMUNIZATION ADMIN: CPT | Performed by: PHYSICIAN ASSISTANT

## 2025-02-14 PROCEDURE — 6360000002 HC RX W HCPCS: Performed by: PHYSICIAN ASSISTANT

## 2025-02-14 PROCEDURE — 96372 THER/PROPH/DIAG INJ SC/IM: CPT

## 2025-02-14 PROCEDURE — 90675 RABIES VACCINE IM: CPT | Performed by: PHYSICIAN ASSISTANT

## 2025-02-14 RX ADMIN — RABIES VACCINE 1 ML: KIT at 08:15

## 2025-02-18 ENCOUNTER — OFFICE VISIT (OUTPATIENT)
Age: 70
End: 2025-02-18

## 2025-02-18 VITALS
RESPIRATION RATE: 18 BRPM | WEIGHT: 167.4 LBS | SYSTOLIC BLOOD PRESSURE: 122 MMHG | OXYGEN SATURATION: 98 % | DIASTOLIC BLOOD PRESSURE: 80 MMHG | BODY MASS INDEX: 28.58 KG/M2 | HEART RATE: 87 BPM | HEIGHT: 64 IN

## 2025-02-18 DIAGNOSIS — Z13.220 SCREENING CHOLESTEROL LEVEL: ICD-10-CM

## 2025-02-18 DIAGNOSIS — T14.8XXA ANIMAL BITE: Primary | ICD-10-CM

## 2025-02-18 DIAGNOSIS — Z23 NEEDS FLU SHOT: ICD-10-CM

## 2025-02-18 DIAGNOSIS — Z09 HOSPITAL DISCHARGE FOLLOW-UP: ICD-10-CM

## 2025-02-18 SDOH — ECONOMIC STABILITY: FOOD INSECURITY: WITHIN THE PAST 12 MONTHS, THE FOOD YOU BOUGHT JUST DIDN'T LAST AND YOU DIDN'T HAVE MONEY TO GET MORE.: NEVER TRUE

## 2025-02-18 SDOH — ECONOMIC STABILITY: FOOD INSECURITY: WITHIN THE PAST 12 MONTHS, YOU WORRIED THAT YOUR FOOD WOULD RUN OUT BEFORE YOU GOT MONEY TO BUY MORE.: NEVER TRUE

## 2025-02-18 ASSESSMENT — PATIENT HEALTH QUESTIONNAIRE - PHQ9
SUM OF ALL RESPONSES TO PHQ QUESTIONS 1-9: 0
SUM OF ALL RESPONSES TO PHQ QUESTIONS 1-9: 0
1. LITTLE INTEREST OR PLEASURE IN DOING THINGS: NOT AT ALL
SUM OF ALL RESPONSES TO PHQ QUESTIONS 1-9: 0
2. FEELING DOWN, DEPRESSED OR HOPELESS: NOT AT ALL
SUM OF ALL RESPONSES TO PHQ9 QUESTIONS 1 & 2: 0
SUM OF ALL RESPONSES TO PHQ QUESTIONS 1-9: 0

## 2025-02-18 NOTE — PROGRESS NOTES
Post-Discharge Transitional Care Management Progress Note      Mae Ordoñez   YOB: 1955    Date of Office Visit:  2/18/2025  Date of Hospital Admission: 07Feb2025  Date of Hospital Discharge: 07feb2025    Care management risk score Rising risk (score 2-5) and Complex Care (Scores >=6): No Risk Score On File     Non face to face  following discharge, date last encounter closed (first attempt may have been earlier): 02/10/2025 02/10/2025    Call initiated 2 business days of discharge: Yes    ASSESSMENT/PLAN:   Animal bite  -     IN DISCHARGE MEDS RECONCILED W/ CURRENT OUTPATIENT MED LIST  Hospital discharge follow-up  -     IN DISCHARGE MEDS RECONCILED W/ CURRENT OUTPATIENT MED LIST  Screening cholesterol level  -     Lipid, Fasting; Future  Needs flu shot   Declines flu shot, needs cholesterol level checked.     Completed doxy/flagyl. Rabies test neg of animal (bat). Cont rabies vaccination series.    Medical Decision Making: moderate complexity  Return in 3 months (on 5/18/2025).         Subjective:   HPI:  Follow up of Hospital problems/diagnosis(es): Animal bite    Inpatient course: Discharge summary reviewed- see chart.    Interval history/Current status:   History of Present Illness  The patient presents for evaluation of a bat bite, esophageal stricture, urinary retention, and cholesterol management.    He reports persistent soreness in his fingertip following a bat bite, which he attributes to the nature of the injury. He has been prescribed antibiotics, including Flagyl, doxycycline, and Diflucan, to prevent a potential yeast infection from the doxycycline. An x-ray was performed to rule out the presence of a tooth fragment in the wound, which was negative. He also received a nerve block injection and continues to experience mild discomfort at the injection site.    He has declined the influenza vaccine during this visit.    He is currently on a statin regimen for cholesterol

## 2025-02-21 ENCOUNTER — HOSPITAL ENCOUNTER (OUTPATIENT)
Dept: INFUSION THERAPY | Age: 70
Setting detail: INFUSION SERIES
Discharge: HOME | End: 2025-02-21
Payer: COMMERCIAL

## 2025-02-21 DIAGNOSIS — Z23 ENCOUNTER FOR VACCINATION: ICD-10-CM

## 2025-02-21 DIAGNOSIS — S61.259A ANIMAL BITE OF FINGER, INITIAL ENCOUNTER: Primary | ICD-10-CM

## 2025-02-21 PROCEDURE — 90675 RABIES VACCINE IM: CPT | Performed by: PHYSICIAN ASSISTANT

## 2025-02-21 PROCEDURE — 6360000002 HC RX W HCPCS: Performed by: PHYSICIAN ASSISTANT

## 2025-02-21 PROCEDURE — 90471 IMMUNIZATION ADMIN: CPT | Performed by: PHYSICIAN ASSISTANT

## 2025-02-21 PROCEDURE — 96372 THER/PROPH/DIAG INJ SC/IM: CPT

## 2025-02-21 RX ADMIN — RABIES VACCINE 1 ML: KIT at 08:35

## 2025-02-21 NOTE — DISCHARGE INSTRUCTIONS
Instructions  Continue home meds, diet and activity  Call your Doctor for any specific questions or problems  If you have any problems and are unable to contact your doctor, go to the Emergency Room.      Thank you for choosing [facility]. It is a pleasure to serve you.        Infusion Unit  [phone]  8AM-5PM

## 2025-02-21 NOTE — PROGRESS NOTES
Ambulatory to unit room 3 for [place].Orientated to unit.Procedure and plan of care explained.Questions answered.Understanding verbalized.Tolerated  well.Reviewed discharge instructions, understanding verbalized.Copies of AVS given to take home. Patient discharged home.Down to exit per self.    Orders Placed This Encounter   Medications    rabies vaccine, [facility] (RABAVERT) injection 1 mL

## 2025-05-01 ENCOUNTER — HOSPITAL ENCOUNTER (OUTPATIENT)
Age: 70
Discharge: HOME | End: 2025-05-01
Payer: COMMERCIAL

## 2025-05-01 DIAGNOSIS — Z13.220 SCREENING CHOLESTEROL LEVEL: ICD-10-CM

## 2025-05-01 LAB
CHOLEST SERPL-MCNC: 82 MG/DL (ref 125–199)
HDLC SERPL-MCNC: 54 MG/DL
LDLC SERPL CALC-MCNC: 19 MG/DL
TRIGL SERPL-MCNC: 47 MG/DL

## 2025-05-01 PROCEDURE — 80061 LIPID PANEL: CPT

## 2025-05-05 SDOH — HEALTH STABILITY: PHYSICAL HEALTH: ON AVERAGE, HOW MANY MINUTES DO YOU ENGAGE IN EXERCISE AT THIS LEVEL?: 90 MIN

## 2025-05-05 SDOH — HEALTH STABILITY: PHYSICAL HEALTH: ON AVERAGE, HOW MANY DAYS PER WEEK DO YOU ENGAGE IN MODERATE TO STRENUOUS EXERCISE (LIKE A BRISK WALK)?: 3 DAYS

## 2025-05-05 ASSESSMENT — PATIENT HEALTH QUESTIONNAIRE - PHQ9
SUM OF ALL RESPONSES TO PHQ QUESTIONS 1-9: 0
1. LITTLE INTEREST OR PLEASURE IN DOING THINGS: NOT AT ALL
2. FEELING DOWN, DEPRESSED OR HOPELESS: NOT AT ALL

## 2025-05-05 ASSESSMENT — LIFESTYLE VARIABLES
HOW OFTEN DO YOU HAVE A DRINK CONTAINING ALCOHOL: 3
HOW OFTEN DO YOU HAVE SIX OR MORE DRINKS ON ONE OCCASION: 1
HOW OFTEN DO YOU HAVE A DRINK CONTAINING ALCOHOL: 2-4 TIMES A MONTH
HOW MANY STANDARD DRINKS CONTAINING ALCOHOL DO YOU HAVE ON A TYPICAL DAY: 1 OR 2
HOW MANY STANDARD DRINKS CONTAINING ALCOHOL DO YOU HAVE ON A TYPICAL DAY: 1

## 2025-05-08 ENCOUNTER — OFFICE VISIT (OUTPATIENT)
Age: 70
End: 2025-05-08

## 2025-05-08 VITALS
BODY MASS INDEX: 28.51 KG/M2 | WEIGHT: 167 LBS | DIASTOLIC BLOOD PRESSURE: 70 MMHG | OXYGEN SATURATION: 97 % | HEART RATE: 90 BPM | RESPIRATION RATE: 20 BRPM | SYSTOLIC BLOOD PRESSURE: 124 MMHG | HEIGHT: 64 IN

## 2025-05-08 VITALS
HEIGHT: 64 IN | BODY MASS INDEX: 28.65 KG/M2 | RESPIRATION RATE: 18 BRPM | WEIGHT: 167.8 LBS | SYSTOLIC BLOOD PRESSURE: 120 MMHG | DIASTOLIC BLOOD PRESSURE: 70 MMHG | OXYGEN SATURATION: 96 % | HEART RATE: 77 BPM

## 2025-05-08 DIAGNOSIS — S76.312A STRAIN OF LEFT HAMSTRING, INITIAL ENCOUNTER: ICD-10-CM

## 2025-05-08 DIAGNOSIS — M75.21 BICEPS TENDINITIS OF RIGHT UPPER EXTREMITY: Primary | ICD-10-CM

## 2025-05-08 DIAGNOSIS — Z00.00 MEDICARE ANNUAL WELLNESS VISIT, SUBSEQUENT: Primary | ICD-10-CM

## 2025-05-08 NOTE — PROGRESS NOTES
Medicare Annual Wellness Visit    Mae Ordoñez is here for Medicare AWV    Assessment & Plan   Medicare annual wellness visit, subsequent     Return in 1 year (on 5/8/2026) for Medicare AWV.     Subjective       Patient's complete Health Risk Assessment and screening values have been reviewed and are found in Flowsheets. The following problems were reviewed today and where indicated follow up appointments were made and/or referrals ordered.    Positive Risk Factor Screenings with Interventions:                    Safety:  Do you have non-slip mats or non-slip surfaces or shower bars or grab bars in your shower or bathtub?: (!) (Patient-Rptd) No  Interventions:  Patient advised to follow up in the office for further evaluation and treatment                   Objective   Vitals:    05/08/25 0859   BP: 120/70   BP Site: Left Upper Arm   Patient Position: Sitting   BP Cuff Size: Large Adult   Pulse: 77   Resp: 18   SpO2: 96%   Weight: 76.1 kg (167 lb 12.8 oz)   Height: 1.626 m (5' 4\")      Body mass index is 28.8 kg/m².                    Allergies   Allergen Reactions    Azithromycin Hives    Levaquin [Levofloxacin In D5w]     Quinolones Hives    Penicillins Hives     Prior to Visit Medications    Medication Sig Taking? Authorizing Provider   BEE POLLEN PO Take by mouth  Ronak Wells MD   Ascorbic Acid (VITAMIN C PO) Take by mouth  Ronak Wells MD   Collagen-Vitamin C-Biotin (COLLAGEN PO) Take by mouth  Ronak Wells MD   Vitamin D3 125 MCG (5000 UT) TABS tablet Take 1 tablet by mouth daily  Ronak Wells MD   rosuvastatin (CRESTOR) 40 MG tablet Take 1 tablet by mouth nightly  Trey Marshall MD   UNABLE TO FIND Glycogen  Ronak Wells MD   magnesium oxide (MAG-OX) 400 MG tablet Take 1 tablet by mouth daily as needed  Ronak Wells MD   CALCIUM LACTATE PO Take by mouth as needed  Ronak Wells MD   UNABLE TO FIND Take 1 tablet by mouth 2 times

## 2025-05-08 NOTE — PROGRESS NOTES
Mae Ordoñez (:  1955) is a 70 y.o. female,Established patient, here for evaluation of the following chief complaint(s):  Sleep Apnea       Assessment & Plan   ASSESSMENT/PLAN:  Assessment & Plan  1. Shoulder pain.  - The pain is localized to the biceps region.  - Advised to incorporate weightlifting into her exercise regimen to enhance metabolism and bone density, potentially reversing osteopenia or osteoporosis.  - Recommended to perform vigorous massages for relief.  - Suggested using a low-potency THC with high CBD content for joint relief if opting for marijuana use.    2. Knee pain.  - The pain is likely due to a bursa behind the knee.  - Advised to use softer or wider balls for massage therapy to avoid exacerbating the condition.  - Suggested the use of CBD as a potential treatment option.  - Discussed the anatomy of the knee and potential sources of pain, including the hamstrings and gastrocnemius muscles.    3. Urinary tract infection (UTI) prevention.  - Encouraged to continue her current regimen of hibiscus and cranberry tea for UTI prevention.  - Discussed the benefits of cranberry for UTI prevention and the option of using unsweetened cranberry products.    1. Biceps tendinitis of right upper extremity      2. Strain of left hamstring, initial encounter          Return in about 7 months (around 2025) for Interval follow-up.       Subjective   SUBJECTIVE/OBJECTIVE:  HPI  Mae Ordoñez is a 70 y.o. pleasant lady presenting today with a chief complaint of HL, ANA.    She has a past medical history significant for:  HL (LDL 30, TG 47 on 2022), not on statin   CAD on Cardiac CT, CAC score 607.   OA   Mild AS on TTE 2024, EF 55-60%, no wall or function deficits  ANA, on CPAP referred [facility]   Bell's palsy L sided (2018)   Overweight  COVID-19 (2020, ?2021)  S/p tubal ligation   Former smoker (quit )   On multiple supplements through Chiropractor     #

## 2025-07-10 DIAGNOSIS — I25.10 CORONARY ARTERY DISEASE INVOLVING NATIVE CORONARY ARTERY OF NATIVE HEART WITHOUT ANGINA PECTORIS: ICD-10-CM

## 2025-07-10 RX ORDER — ROSUVASTATIN CALCIUM 40 MG/1
40 TABLET, COATED ORAL NIGHTLY
Qty: 90 TABLET | Refills: 0 | Status: SHIPPED | OUTPATIENT
Start: 2025-07-10

## 2025-08-05 ENCOUNTER — TELEPHONE (OUTPATIENT)
Age: 70
End: 2025-08-05

## 2025-08-05 DIAGNOSIS — Z12.31 ENCOUNTER FOR SCREENING MAMMOGRAM FOR MALIGNANT NEOPLASM OF BREAST: Primary | ICD-10-CM

## 2025-09-05 ENCOUNTER — HOSPITAL ENCOUNTER (OUTPATIENT)
Dept: WOMENS IMAGING | Age: 70
Discharge: HOME | End: 2025-09-05
Payer: COMMERCIAL

## 2025-09-05 VITALS — WEIGHT: 162 LBS | HEIGHT: 65 IN | BODY MASS INDEX: 26.99 KG/M2

## 2025-09-05 DIAGNOSIS — Z12.31 ENCOUNTER FOR SCREENING MAMMOGRAM FOR MALIGNANT NEOPLASM OF BREAST: ICD-10-CM

## 2025-09-05 PROCEDURE — 77063 BREAST TOMOSYNTHESIS BI: CPT

## (undated) DEVICE — ENDOSCOPY KIT: Brand: MEDLINE INDUSTRIES, INC.

## (undated) DEVICE — FORCEPS BX L240CM JAW DIA2.8MM L CAP W/ NDL MIC MESH TOOTH

## (undated) DEVICE — SNARE ENDOSCP CLD 230 CM 10 MM 2.3 MM ROTATABLE LESIONHUNTER